# Patient Record
Sex: FEMALE | Race: WHITE | HISPANIC OR LATINO | Employment: UNEMPLOYED | URBAN - METROPOLITAN AREA
[De-identification: names, ages, dates, MRNs, and addresses within clinical notes are randomized per-mention and may not be internally consistent; named-entity substitution may affect disease eponyms.]

---

## 2017-03-31 ENCOUNTER — HOSPITAL ENCOUNTER (OUTPATIENT)
Dept: LAB | Facility: MEDICAL CENTER | Age: 37
End: 2017-03-31
Attending: PHYSICIAN ASSISTANT
Payer: COMMERCIAL

## 2017-03-31 LAB
ALBUMIN SERPL BCP-MCNC: 4.5 G/DL (ref 3.2–4.9)
ALBUMIN/GLOB SERPL: 1.3 G/DL
ALP SERPL-CCNC: 56 U/L (ref 30–99)
ALT SERPL-CCNC: 13 U/L (ref 2–50)
ANION GAP SERPL CALC-SCNC: 8 MMOL/L (ref 0–11.9)
AST SERPL-CCNC: 13 U/L (ref 12–45)
BASOPHILS # BLD AUTO: 0.03 K/UL (ref 0–0.12)
BASOPHILS NFR BLD AUTO: 0.3 % (ref 0–1.8)
BILIRUB SERPL-MCNC: 0.5 MG/DL (ref 0.1–1.5)
BUN SERPL-MCNC: 8 MG/DL (ref 8–22)
CALCIUM SERPL-MCNC: 9.4 MG/DL (ref 8.5–10.5)
CHLORIDE SERPL-SCNC: 105 MMOL/L (ref 96–112)
CO2 SERPL-SCNC: 24 MMOL/L (ref 20–33)
CREAT SERPL-MCNC: 0.53 MG/DL (ref 0.5–1.4)
EOSINOPHIL # BLD: 0.01 K/UL (ref 0–0.51)
EOSINOPHIL NFR BLD AUTO: 0.1 % (ref 0–6.9)
ERYTHROCYTE [DISTWIDTH] IN BLOOD BY AUTOMATED COUNT: 42.1 FL (ref 35.9–50)
GLOBULIN SER CALC-MCNC: 3.5 G/DL (ref 1.9–3.5)
GLUCOSE SERPL-MCNC: 106 MG/DL (ref 65–99)
HCT VFR BLD AUTO: 42.5 % (ref 37–47)
HGB BLD-MCNC: 13.7 G/DL (ref 12–16)
IMM GRANULOCYTES # BLD AUTO: 0.02 K/UL (ref 0–0.11)
IMM GRANULOCYTES NFR BLD AUTO: 0.2 % (ref 0–0.9)
LYMPHOCYTES # BLD: 1.14 K/UL (ref 1–4.8)
LYMPHOCYTES NFR BLD AUTO: 13.3 % (ref 22–41)
MCH RBC QN AUTO: 28.8 PG (ref 27–33)
MCHC RBC AUTO-ENTMCNC: 32.2 G/DL (ref 33.6–35)
MCV RBC AUTO: 89.5 FL (ref 81.4–97.8)
MONOCYTES # BLD: 0.3 K/UL (ref 0–0.85)
MONOCYTES NFR BLD AUTO: 3.5 % (ref 0–13.4)
NEUTROPHILS # BLD: 7.08 K/UL (ref 2–7.15)
NEUTROPHILS NFR BLD AUTO: 82.6 % (ref 44–72)
NRBC # BLD AUTO: 0 K/UL
NRBC BLD-RTO: 0 /100 WBC
PLATELET # BLD AUTO: 278 K/UL (ref 164–446)
PMV BLD AUTO: 11.7 FL (ref 9–12.9)
POTASSIUM SERPL-SCNC: 4 MMOL/L (ref 3.6–5.5)
PROT SERPL-MCNC: 8 G/DL (ref 6–8.2)
RBC # BLD AUTO: 4.75 M/UL (ref 4.2–5.4)
SODIUM SERPL-SCNC: 137 MMOL/L (ref 135–145)
TSH SERPL DL<=0.005 MIU/L-ACNC: 1.23 UIU/ML (ref 0.3–3.7)
WBC # BLD AUTO: 8.6 K/UL (ref 4.8–10.8)

## 2017-03-31 PROCEDURE — 36415 COLL VENOUS BLD VENIPUNCTURE: CPT

## 2017-03-31 PROCEDURE — 85025 COMPLETE CBC W/AUTO DIFF WBC: CPT

## 2017-03-31 PROCEDURE — 84443 ASSAY THYROID STIM HORMONE: CPT

## 2017-03-31 PROCEDURE — 80053 COMPREHEN METABOLIC PANEL: CPT

## 2017-08-03 ENCOUNTER — HOSPITAL ENCOUNTER (OUTPATIENT)
Facility: MEDICAL CENTER | Age: 37
End: 2017-08-03
Attending: PHYSICIAN ASSISTANT
Payer: COMMERCIAL

## 2017-08-03 PROCEDURE — 88175 CYTOPATH C/V AUTO FLUID REDO: CPT

## 2017-08-03 PROCEDURE — 87624 HPV HI-RISK TYP POOLED RSLT: CPT

## 2017-08-04 ENCOUNTER — HOSPITAL ENCOUNTER (OUTPATIENT)
Facility: MEDICAL CENTER | Age: 37
End: 2017-08-04
Attending: PHYSICIAN ASSISTANT
Payer: COMMERCIAL

## 2017-08-04 ENCOUNTER — HOSPITAL ENCOUNTER (OUTPATIENT)
Dept: LAB | Facility: MEDICAL CENTER | Age: 37
End: 2017-08-04
Attending: PHYSICIAN ASSISTANT
Payer: COMMERCIAL

## 2017-08-04 ENCOUNTER — HOSPITAL ENCOUNTER (OUTPATIENT)
Dept: RADIOLOGY | Facility: MEDICAL CENTER | Age: 37
End: 2017-08-04
Attending: PHYSICIAN ASSISTANT
Payer: COMMERCIAL

## 2017-08-04 DIAGNOSIS — R63.4 LOSS OF WEIGHT: ICD-10-CM

## 2017-08-04 LAB — GFR SERPL CREATININE-BSD FRML MDRD: >60 ML/MIN/1.73 M 2

## 2017-08-04 PROCEDURE — 87389 HIV-1 AG W/HIV-1&-2 AB AG IA: CPT

## 2017-08-04 PROCEDURE — 86141 C-REACTIVE PROTEIN HS: CPT

## 2017-08-04 PROCEDURE — 81001 URINALYSIS AUTO W/SCOPE: CPT

## 2017-08-04 PROCEDURE — 83036 HEMOGLOBIN GLYCOSYLATED A1C: CPT

## 2017-08-04 PROCEDURE — 80053 COMPREHEN METABOLIC PANEL: CPT

## 2017-08-04 PROCEDURE — 84436 ASSAY OF TOTAL THYROXINE: CPT

## 2017-08-04 PROCEDURE — 84480 ASSAY TRIIODOTHYRONINE (T3): CPT

## 2017-08-04 PROCEDURE — 85652 RBC SED RATE AUTOMATED: CPT

## 2017-08-04 PROCEDURE — 86803 HEPATITIS C AB TEST: CPT

## 2017-08-04 PROCEDURE — 85025 COMPLETE CBC W/AUTO DIFF WBC: CPT

## 2017-08-04 PROCEDURE — 84479 ASSAY OF THYROID (T3 OR T4): CPT

## 2017-08-04 PROCEDURE — 84443 ASSAY THYROID STIM HORMONE: CPT

## 2017-08-04 PROCEDURE — 71020 DX-CHEST-2 VIEWS: CPT

## 2017-08-04 PROCEDURE — 36415 COLL VENOUS BLD VENIPUNCTURE: CPT

## 2017-08-05 ENCOUNTER — HOSPITAL ENCOUNTER (OUTPATIENT)
Facility: MEDICAL CENTER | Age: 37
End: 2017-08-05
Attending: PHYSICIAN ASSISTANT
Payer: COMMERCIAL

## 2017-08-05 LAB
ALBUMIN SERPL BCP-MCNC: 4.6 G/DL (ref 3.2–4.9)
ALBUMIN/GLOB SERPL: 1.5 G/DL
ALP SERPL-CCNC: 49 U/L (ref 30–99)
ALT SERPL-CCNC: 11 U/L (ref 2–50)
ANION GAP SERPL CALC-SCNC: 7 MMOL/L (ref 0–11.9)
APPEARANCE UR: CLEAR
AST SERPL-CCNC: 14 U/L (ref 12–45)
BACTERIA #/AREA URNS HPF: ABNORMAL /HPF
BASOPHILS # BLD AUTO: 0.4 % (ref 0–1.8)
BASOPHILS # BLD: 0.02 K/UL (ref 0–0.12)
BILIRUB SERPL-MCNC: 0.7 MG/DL (ref 0.1–1.5)
BILIRUB UR QL STRIP.AUTO: NEGATIVE
BUN SERPL-MCNC: 8 MG/DL (ref 8–22)
CALCIUM SERPL-MCNC: 9.8 MG/DL (ref 8.5–10.5)
CHLORIDE SERPL-SCNC: 107 MMOL/L (ref 96–112)
CO2 SERPL-SCNC: 24 MMOL/L (ref 20–33)
COLOR UR: ABNORMAL
CREAT SERPL-MCNC: 0.57 MG/DL (ref 0.5–1.4)
CRP SERPL HS-MCNC: 1.4 MG/L (ref 0–7.5)
CYTOLOGY REG CYTOL: NORMAL
EOSINOPHIL # BLD AUTO: 0.06 K/UL (ref 0–0.51)
EOSINOPHIL NFR BLD: 1.2 % (ref 0–6.9)
EPI CELLS #/AREA URNS HPF: ABNORMAL /HPF
ERYTHROCYTE [DISTWIDTH] IN BLOOD BY AUTOMATED COUNT: 41.8 FL (ref 35.9–50)
ERYTHROCYTE [SEDIMENTATION RATE] IN BLOOD BY WESTERGREN METHOD: 11 MM/HOUR (ref 0–20)
EST. AVERAGE GLUCOSE BLD GHB EST-MCNC: 103 MG/DL
GLOBULIN SER CALC-MCNC: 3.1 G/DL (ref 1.9–3.5)
GLUCOSE SERPL-MCNC: 83 MG/DL (ref 65–99)
GLUCOSE UR STRIP.AUTO-MCNC: NEGATIVE MG/DL
HBA1C MFR BLD: 5.2 % (ref 0–5.6)
HCT VFR BLD AUTO: 41.4 % (ref 37–47)
HCV AB SER QL: NEGATIVE
HGB BLD-MCNC: 13.6 G/DL (ref 12–16)
HIV 1+2 AB+HIV1 P24 AG SERPL QL IA: NON REACTIVE
HPV HR 12 DNA CVX QL NAA+PROBE: NEGATIVE
HPV16 DNA SPEC QL NAA+PROBE: NEGATIVE
HPV18 DNA SPEC QL NAA+PROBE: NEGATIVE
IMM GRANULOCYTES # BLD AUTO: 0.01 K/UL (ref 0–0.11)
IMM GRANULOCYTES NFR BLD AUTO: 0.2 % (ref 0–0.9)
KETONES UR STRIP.AUTO-MCNC: NEGATIVE MG/DL
LEUKOCYTE ESTERASE UR QL STRIP.AUTO: NEGATIVE
LYMPHOCYTES # BLD AUTO: 1.14 K/UL (ref 1–4.8)
LYMPHOCYTES NFR BLD: 22.1 % (ref 22–41)
MCH RBC QN AUTO: 29.4 PG (ref 27–33)
MCHC RBC AUTO-ENTMCNC: 32.9 G/DL (ref 33.6–35)
MCV RBC AUTO: 89.4 FL (ref 81.4–97.8)
MICRO URNS: ABNORMAL
MONOCYTES # BLD AUTO: 0.36 K/UL (ref 0–0.85)
MONOCYTES NFR BLD AUTO: 7 % (ref 0–13.4)
MUCOUS THREADS #/AREA URNS HPF: ABNORMAL /HPF
NEUTROPHILS # BLD AUTO: 3.56 K/UL (ref 2–7.15)
NEUTROPHILS NFR BLD: 69.1 % (ref 44–72)
NITRITE UR QL STRIP.AUTO: NEGATIVE
NRBC # BLD AUTO: 0 K/UL
NRBC BLD AUTO-RTO: 0 /100 WBC
PH UR STRIP.AUTO: 8 [PH]
PLATELET # BLD AUTO: 213 K/UL (ref 164–446)
PMV BLD AUTO: 11.8 FL (ref 9–12.9)
POTASSIUM SERPL-SCNC: 3.9 MMOL/L (ref 3.6–5.5)
PROT SERPL-MCNC: 7.7 G/DL (ref 6–8.2)
PROT UR QL STRIP: NEGATIVE MG/DL
RBC # BLD AUTO: 4.63 M/UL (ref 4.2–5.4)
RBC # URNS HPF: ABNORMAL /HPF
RBC UR QL AUTO: ABNORMAL
SODIUM SERPL-SCNC: 138 MMOL/L (ref 135–145)
SP GR UR STRIP.AUTO: 1
SPECIMEN SOURCE: NORMAL
T3 SERPL-MCNC: 91.2 NG/DL (ref 60–181)
T4 SERPL-MCNC: 7.1 UG/DL (ref 4–12)
TSH SERPL DL<=0.005 MIU/L-ACNC: 1.89 UIU/ML (ref 0.3–3.7)
UROBILINOGEN UR STRIP.AUTO-MCNC: 0.2 MG/DL
WBC # BLD AUTO: 5.2 K/UL (ref 4.8–10.8)
WBC #/AREA URNS HPF: ABNORMAL /HPF

## 2017-08-05 PROCEDURE — 82274 ASSAY TEST FOR BLOOD FECAL: CPT

## 2017-08-06 LAB — T3RU NFR SERPL: 36 % (ref 28–41)

## 2017-08-09 LAB — HEMOCCULT STL QL IA: NEGATIVE

## 2017-08-12 ENCOUNTER — APPOINTMENT (OUTPATIENT)
Dept: RADIOLOGY | Facility: MEDICAL CENTER | Age: 37
End: 2017-08-12
Attending: PHYSICIAN ASSISTANT
Payer: COMMERCIAL

## 2018-01-02 ENCOUNTER — HOSPITAL ENCOUNTER (EMERGENCY)
Facility: MEDICAL CENTER | Age: 38
End: 2018-01-02
Attending: EMERGENCY MEDICINE
Payer: COMMERCIAL

## 2018-01-02 VITALS
TEMPERATURE: 98.3 F | RESPIRATION RATE: 17 BRPM | WEIGHT: 125.66 LBS | HEART RATE: 96 BPM | HEIGHT: 62 IN | OXYGEN SATURATION: 99 % | SYSTOLIC BLOOD PRESSURE: 118 MMHG | DIASTOLIC BLOOD PRESSURE: 82 MMHG | BODY MASS INDEX: 23.12 KG/M2

## 2018-01-02 DIAGNOSIS — J32.9 SINUSITIS, UNSPECIFIED CHRONICITY, UNSPECIFIED LOCATION: ICD-10-CM

## 2018-01-02 DIAGNOSIS — R10.9 ABDOMINAL PAIN, UNSPECIFIED ABDOMINAL LOCATION: ICD-10-CM

## 2018-01-02 DIAGNOSIS — J34.89 NASAL OBSTRUCTION: ICD-10-CM

## 2018-01-02 LAB
ALBUMIN SERPL BCP-MCNC: 4.3 G/DL (ref 3.2–4.9)
ALBUMIN/GLOB SERPL: 1.3 G/DL
ALP SERPL-CCNC: 60 U/L (ref 30–99)
ALT SERPL-CCNC: 10 U/L (ref 2–50)
ANION GAP SERPL CALC-SCNC: 3 MMOL/L (ref 0–11.9)
AST SERPL-CCNC: 13 U/L (ref 12–45)
BASOPHILS # BLD AUTO: 0.4 % (ref 0–1.8)
BASOPHILS # BLD: 0.03 K/UL (ref 0–0.12)
BILIRUB SERPL-MCNC: 0.2 MG/DL (ref 0.1–1.5)
BUN SERPL-MCNC: 8 MG/DL (ref 8–22)
CALCIUM SERPL-MCNC: 9.7 MG/DL (ref 8.5–10.5)
CHLORIDE SERPL-SCNC: 105 MMOL/L (ref 96–112)
CO2 SERPL-SCNC: 31 MMOL/L (ref 20–33)
CREAT SERPL-MCNC: 0.49 MG/DL (ref 0.5–1.4)
EOSINOPHIL # BLD AUTO: 0.05 K/UL (ref 0–0.51)
EOSINOPHIL NFR BLD: 0.7 % (ref 0–6.9)
ERYTHROCYTE [DISTWIDTH] IN BLOOD BY AUTOMATED COUNT: 38.9 FL (ref 35.9–50)
FLUAV RNA SPEC QL NAA+PROBE: NEGATIVE
FLUBV RNA SPEC QL NAA+PROBE: NEGATIVE
GFR SERPL CREATININE-BSD FRML MDRD: >60 ML/MIN/1.73 M 2
GLOBULIN SER CALC-MCNC: 3.2 G/DL (ref 1.9–3.5)
GLUCOSE SERPL-MCNC: 98 MG/DL (ref 65–99)
HCT VFR BLD AUTO: 36.8 % (ref 37–47)
HGB BLD-MCNC: 12.6 G/DL (ref 12–16)
IMM GRANULOCYTES # BLD AUTO: 0.03 K/UL (ref 0–0.11)
IMM GRANULOCYTES NFR BLD AUTO: 0.4 % (ref 0–0.9)
LYMPHOCYTES # BLD AUTO: 1.42 K/UL (ref 1–4.8)
LYMPHOCYTES NFR BLD: 20.8 % (ref 22–41)
MCH RBC QN AUTO: 29.6 PG (ref 27–33)
MCHC RBC AUTO-ENTMCNC: 34.2 G/DL (ref 33.6–35)
MCV RBC AUTO: 86.6 FL (ref 81.4–97.8)
MONOCYTES # BLD AUTO: 0.34 K/UL (ref 0–0.85)
MONOCYTES NFR BLD AUTO: 5 % (ref 0–13.4)
NEUTROPHILS # BLD AUTO: 4.96 K/UL (ref 2–7.15)
NEUTROPHILS NFR BLD: 72.7 % (ref 44–72)
NRBC # BLD AUTO: 0 K/UL
NRBC BLD-RTO: 0 /100 WBC
PLATELET # BLD AUTO: 272 K/UL (ref 164–446)
PMV BLD AUTO: 10.9 FL (ref 9–12.9)
POTASSIUM SERPL-SCNC: 3.5 MMOL/L (ref 3.6–5.5)
PROT SERPL-MCNC: 7.5 G/DL (ref 6–8.2)
RBC # BLD AUTO: 4.25 M/UL (ref 4.2–5.4)
SODIUM SERPL-SCNC: 139 MMOL/L (ref 135–145)
WBC # BLD AUTO: 6.8 K/UL (ref 4.8–10.8)

## 2018-01-02 PROCEDURE — 80053 COMPREHEN METABOLIC PANEL: CPT

## 2018-01-02 PROCEDURE — 85025 COMPLETE CBC W/AUTO DIFF WBC: CPT

## 2018-01-02 PROCEDURE — 99284 EMERGENCY DEPT VISIT MOD MDM: CPT

## 2018-01-02 PROCEDURE — 87502 INFLUENZA DNA AMP PROBE: CPT

## 2018-01-02 RX ORDER — AMOXICILLIN 875 MG/1
875 TABLET, COATED ORAL 2 TIMES DAILY
Qty: 14 TAB | Refills: 0 | Status: SHIPPED | OUTPATIENT
Start: 2018-01-02 | End: 2018-01-09

## 2018-01-02 ASSESSMENT — LIFESTYLE VARIABLES: DO YOU DRINK ALCOHOL: NO

## 2018-01-03 NOTE — ED NOTES
"Chief Complaint   Patient presents with   • Congestion     x 1 month   • Flu Like Symptoms      used - pt reports congestion x 1 month, flu like symptoms, recently dx with H pylori, c/o abdominal pain. Pt given mask to wear.     /82   Pulse 96   Temp 36.8 °C (98.3 °F) (Temporal)   Resp 17   Ht 1.575 m (5' 2\")   Wt 57 kg (125 lb 10.6 oz)   SpO2 99%   BMI 22.98 kg/m²     Pt Informed regarding triage process and verbalized understanding to inform triage tech or RN for any changes in condition.  Placed in lobby.    "

## 2018-01-03 NOTE — ED NOTES
Primary assessment complete. Chart up for review by erp. Patient complains of left sided weakness for 2 months. ERP informed

## 2018-01-03 NOTE — DISCHARGE INSTRUCTIONS
Sinusitis, Adult  Sinusitis is redness, soreness, and puffiness (inflammation) of the air pockets in the bones of your face (sinuses). The redness, soreness, and puffiness can cause air and mucus to get trapped in your sinuses. This can allow germs to grow and cause an infection.   HOME CARE   · Drink enough fluids to keep your pee (urine) clear or pale yellow.  · Use a humidifier in your home.  · Run a hot shower to create steam in the bathroom. Sit in the bathroom with the door closed. Breathe in the steam 3-4 times a day.  · Put a warm, moist washcloth on your face 3-4 times a day, or as told by your doctor.  · Use salt water sprays (saline sprays) to wet the thick fluid in your nose. This can help the sinuses drain.  · Only take medicine as told by your doctor.  GET HELP RIGHT AWAY IF:   · Your pain gets worse.  · You have very bad headaches.  · You are sick to your stomach (nauseous).  · You throw up (vomit).  · You are very sleepy (drowsy) all the time.  · Your face is puffy (swollen).  · Your vision changes.  · You have a stiff neck.  · You have trouble breathing.  MAKE SURE YOU:   · Understand these instructions.  · Will watch your condition.  · Will get help right away if you are not doing well or get worse.     This information is not intended to replace advice given to you by your health care provider. Make sure you discuss any questions you have with your health care provider.     Document Released: 06/05/2009 Document Revised: 01/08/2016 Document Reviewed: 07/23/2013  [employer] Interactive Patient Education ©2016 [employer]      Abdominal Pain, Adult  Many things can cause belly (abdominal) pain. Most times, the belly pain is not dangerous. Many cases of belly pain can be watched and treated at home.  HOME CARE   · Do not take medicines that help you go poop (laxatives) unless told to by your doctor.  · Only take medicine as told by your doctor.  · Eat or drink as told by your doctor. Your doctor will  tell you if you should be on a special diet.  GET HELP IF:  · You do not know what is causing your belly pain.  · You have belly pain while you are sick to your stomach (nauseous) or have runny poop (diarrhea).  · You have pain while you pee or poop.  · Your belly pain wakes you up at night.  · You have belly pain that gets worse or better when you eat.  · You have belly pain that gets worse when you eat fatty foods.  · You have a fever.  GET HELP RIGHT AWAY IF:   · The pain does not go away within 2 hours.  · You keep throwing up (vomiting).  · The pain changes and is only in the right or left part of the belly.  · You have bloody or tarry looking poop.  MAKE SURE YOU:   · Understand these instructions.  · Will watch your condition.  · Will get help right away if you are not doing well or get worse.     This information is not intended to replace advice given to you by your health care provider. Make sure you discuss any questions you have with your health care provider.     Document Released: 06/05/2009 Document Revised: 01/08/2016 Document Reviewed: 08/27/2014  [employer] Interactive Patient Education ©2016 [employer]

## 2018-01-03 NOTE — ED PROVIDER NOTES
"ED Provider Note    Scribed for Dr. Kilo Pichardo M.D. by Fawn Serrano. 1/2/2018  9:09 PM    Primary care provider: Pcp Pt States None  Means of arrival: Walk-in  History obtained from: Patient   History limited by: None    CHIEF COMPLAINT  Chief Complaint   Patient presents with   • Congestion     x 1 month   • Flu Like Symptoms       HPI  Sonja Bergeron is a 37 y.o. female who presents to the Emergency Department for a congestion and sinus pain associated that began one month ago and worsening today. Her sinus pain is exacerbated upon inspiration. She has used Anti-Histamine and Flonase with minimal relief. Patient came to ED tonight secondary to her congestion not improving. She also states that she was recently diagnosed with H-Pylori and anemia and has been taking her Prilosec and Vitamin B with minimal relief of her abdominal pain and weakness which she states is chronic. She describes her abdominal pain as \"intestinal cramps\" to her lower quadrant that is exacerbated with eating. [facility] for primary care.     : [id number]    REVIEW OF SYSTEMS  Pertinent positives include congestion, sinus pain. Pertinent negatives include no nausea, vomiting, fever . As above, all other systems are negative.  See HPI for further details.   C.    PAST MEDICAL HISTORY   H-Pylori, Anemia.     SURGICAL HISTORY   has a past surgical history that includes other neurological surg.    SOCIAL HISTORY  Social History   Substance Use Topics   • Smoking status: Never Smoker   • Alcohol use No      History   Drug Use No       FAMILY HISTORY  No family history noted    CURRENT MEDICATIONS  No current facility-administered medications on file prior to encounter.      Current Outpatient Prescriptions on File Prior to Encounter   Medication Sig Dispense Refill   • PREDNISONE Take  by mouth 2 Times a Day.       • ACYCLOVIR PO Take  by mouth every day.           ALLERGIES  No " "Known Allergies    PHYSICAL EXAM  VITAL SIGNS: /82   Pulse 96   Temp 36.8 °C (98.3 °F) (Temporal)   Resp 17   Ht 1.575 m (5' 2\")   Wt 57 kg (125 lb 10.6 oz)   SpO2 99%   BMI 22.98 kg/m²     Constitutional: Well developed, Well nourished, no distress, Non-toxic appearance.   HENT: Normocephalic, Atraumatic, Bilateral external ears normal, Oropharynx moist, No oral exudates. Right nostril appears to be occluded.   Eyes: PERRLA, EOMI, Conjunctiva normal, No discharge.   Neck: No tenderness, Supple, No stridor.   Lymphatic: No lymphadenopathy noted.   Cardiovascular: Normal heart rate, Normal rhythm.   Thorax & Lungs: Clear to auscultation bilaterally, No respiratory distress, No wheezing, No crackles.   Abdomen: Soft, epigastric tenderness, No masses, No pulsatile masses.   Skin: Warm, Dry, No erythema, No rash.   Extremities:, No edema No cyanosis.   Musculoskeletal: No tenderness to palpation or major deformities noted.  Intact distal pulses  Neurologic: Awake, alert. Moves all extremities spontaneously.  Psychiatric: Affect normal, Judgment normal, Mood normal.        LABS  Results for orders placed or performed during the hospital encounter of 01/02/18   INFLUENZA A/B BY PCR   Result Value Ref Range    Influenza virus A RNA Negative Negative    Influenza virus B, PCR Negative Negative   CBC WITH DIFFERENTIAL   Result Value Ref Range    WBC 6.8 4.8 - 10.8 K/uL    RBC 4.25 4.20 - 5.40 M/uL    Hemoglobin 12.6 12.0 - 16.0 g/dL    Hematocrit 36.8 (L) 37.0 - 47.0 %    MCV 86.6 81.4 - 97.8 fL    MCH 29.6 27.0 - 33.0 pg    MCHC 34.2 33.6 - 35.0 g/dL    RDW 38.9 35.9 - 50.0 fL    Platelet Count 272 164 - 446 K/uL    MPV 10.9 9.0 - 12.9 fL    Neutrophils-Polys 72.70 (H) 44.00 - 72.00 %    Lymphocytes 20.80 (L) 22.00 - 41.00 %    Monocytes 5.00 0.00 - 13.40 %    Eosinophils 0.70 0.00 - 6.90 %    Basophils 0.40 0.00 - 1.80 %    Immature Granulocytes 0.40 0.00 - 0.90 %    Nucleated RBC 0.00 /100 WBC    Neutrophils " (Absolute) 4.96 2.00 - 7.15 K/uL    Lymphs (Absolute) 1.42 1.00 - 4.80 K/uL    Monos (Absolute) 0.34 0.00 - 0.85 K/uL    Eos (Absolute) 0.05 0.00 - 0.51 K/uL    Baso (Absolute) 0.03 0.00 - 0.12 K/uL    Immature Granulocytes (abs) 0.03 0.00 - 0.11 K/uL    NRBC (Absolute) 0.00 K/uL   COMP METABOLIC PANEL   Result Value Ref Range    Sodium 139 135 - 145 mmol/L    Potassium 3.5 (L) 3.6 - 5.5 mmol/L    Chloride 105 96 - 112 mmol/L    Co2 31 20 - 33 mmol/L    Anion Gap 3.0 0.0 - 11.9    Glucose 98 65 - 99 mg/dL    Bun 8 8 - 22 mg/dL    Creatinine 0.49 (L) 0.50 - 1.40 mg/dL    Calcium 9.7 8.5 - 10.5 mg/dL    AST(SGOT) 13 12 - 45 U/L    ALT(SGPT) 10 2 - 50 U/L    Alkaline Phosphatase 60 30 - 99 U/L    Total Bilirubin 0.2 0.1 - 1.5 mg/dL    Albumin 4.3 3.2 - 4.9 g/dL    Total Protein 7.5 6.0 - 8.2 g/dL    Globulin 3.2 1.9 - 3.5 g/dL    A-G Ratio 1.3 g/dL   ESTIMATED GFR   Result Value Ref Range    GFR If African American >60 >60 mL/min/1.73 m 2    GFR If Non African American >60 >60 mL/min/1.73 m 2       COURSE & MEDICAL DECISION MAKING  Pertinent Labs & Imaging studies reviewed. (See chart for details)    9:09 PM - Patient seen and examined at bedside. Discussed that the patient has a nasal obstruction and advised her to follow-up with ENT. Discussed that there is little I can do in the ED for her congestion symptoms. For the patient's anemia, discussed I can check labs to evaluate. Ordered CBC, CMP, Influenza by PCR. Differential diagnosis included, but not limited to: nostril obstruction, sinusitis, peptic ulcer disease, pernicious anemia.     10:58 PM Recheck: Patient re-evaluated at beside. Discussed that patient's lab results do not indicate any abnormal findings. Discussed that I will treat patient with 875 Amoxicillin for sinus infection and advised patient to follow-up with Dr. Leonard (ENT). Patient was counseled to return to ED for any new or worsening symptoms. Patient understood and is in agreement for discharge.        Decision Making:  Patient is having several complaints which all seem to be long-standing duration with some obstruction of the right nostril possibly secondary sinusitis today, ongoing problems with abdominal pain previously diagnosis peptic ulcer disease and currently not being treated, long-standing left-sided weakness,. I think all these can be managed with outpatient follow-up. Will refer her to ENT for follow-up of his right nasal obstruction not clear if it's a polyp turbinate or other etiology may need further evaluation but I don't think he needs to be done on emergent basis since this is probably a year old. Did advise follow-up the heart clinic and advised use Prilosec for her abdominal pain  DISPOSITION:  Patient will be discharged home in stable condition.    FOLLOW UP:  Andra Carvajal M.D.  [address] [place] [id number]  [phone]            OUTPATIENT MEDICATIONS:  New Prescriptions    AMOXICILLIN (AMOXIL) 875 MG TABLET    Take 1 Tab by mouth 2 times a day for 7 days.         FINAL IMPRESSION  1. Nasal obstruction    2. Sinusitis, unspecified chronicity, unspecified location    3. Abdominal pain, unspecified abdominal location          Fawn GRAY (Silvia), am scribing for, and in the presence of, Kilo Pichardo M.D..    Electronically signed by: Fawn Serrano (Silvia), 1/2/2018    Kilo GRAY M.D. personally performed the services described in this documentation, as scribed by Fawn Serrano in my presence, and it is both accurate and complete.    The note accurately reflects work and decisions made by me.  Kilo Pichardo  1/2/2018  11:39 PM

## 2018-01-15 ENCOUNTER — HOSPITAL ENCOUNTER (OUTPATIENT)
Dept: LAB | Facility: MEDICAL CENTER | Age: 38
End: 2018-01-15
Attending: PHYSICIAN ASSISTANT
Payer: COMMERCIAL

## 2018-01-15 LAB
ERYTHROCYTE [DISTWIDTH] IN BLOOD BY AUTOMATED COUNT: 39.7 FL (ref 35.9–50)
FOLATE SERPL-MCNC: 20.4 NG/ML
HCT VFR BLD AUTO: 38.1 % (ref 37–47)
HGB BLD-MCNC: 12.6 G/DL (ref 12–16)
MCH RBC QN AUTO: 29 PG (ref 27–33)
MCHC RBC AUTO-ENTMCNC: 33.1 G/DL (ref 33.6–35)
MCV RBC AUTO: 87.8 FL (ref 81.4–97.8)
PLATELET # BLD AUTO: 234 K/UL (ref 164–446)
PMV BLD AUTO: 11.5 FL (ref 9–12.9)
RBC # BLD AUTO: 4.34 M/UL (ref 4.2–5.4)
VIT B12 SERPL-MCNC: 866 PG/ML (ref 211–911)
WBC # BLD AUTO: 6.1 K/UL (ref 4.8–10.8)

## 2018-01-15 PROCEDURE — 36415 COLL VENOUS BLD VENIPUNCTURE: CPT

## 2018-01-15 PROCEDURE — 82607 VITAMIN B-12: CPT

## 2018-01-15 PROCEDURE — 85027 COMPLETE CBC AUTOMATED: CPT

## 2018-01-15 PROCEDURE — 82746 ASSAY OF FOLIC ACID SERUM: CPT

## 2018-01-24 ENCOUNTER — HOSPITAL ENCOUNTER (OUTPATIENT)
Dept: LAB | Facility: MEDICAL CENTER | Age: 38
End: 2018-01-24
Attending: PHYSICIAN ASSISTANT
Payer: COMMERCIAL

## 2018-01-24 PROCEDURE — 83013 H PYLORI (C-13) BREATH: CPT

## 2018-01-26 LAB — UREA BREATH TEST QL: NEGATIVE

## 2018-02-05 ENCOUNTER — HOSPITAL ENCOUNTER (OUTPATIENT)
Dept: RADIOLOGY | Facility: MEDICAL CENTER | Age: 38
End: 2018-02-05
Attending: OTOLARYNGOLOGY
Payer: COMMERCIAL

## 2018-02-05 DIAGNOSIS — D14.0 BENIGN NEOPLASM OF CARTILAGE OF NOSE: ICD-10-CM

## 2018-02-05 PROCEDURE — 70486 CT MAXILLOFACIAL W/O DYE: CPT

## 2018-02-26 ENCOUNTER — HOSPITAL ENCOUNTER (OUTPATIENT)
Dept: RADIOLOGY | Facility: MEDICAL CENTER | Age: 38
End: 2018-02-26
Attending: OTOLARYNGOLOGY
Payer: COMMERCIAL

## 2018-02-26 DIAGNOSIS — C44.311 BASAL CELL CARCINOMA OF NASOLABIAL GROOVE: ICD-10-CM

## 2018-02-26 DIAGNOSIS — C30.0 MALIGNANT NEOPLASM OF NASAL CAVITIES (HCC): ICD-10-CM

## 2018-02-26 PROCEDURE — 700117 HCHG RX CONTRAST REV CODE 255: Performed by: OTOLARYNGOLOGY

## 2018-02-26 PROCEDURE — 70543 MRI ORBT/FAC/NCK W/O &W/DYE: CPT

## 2018-02-26 PROCEDURE — A9579 GAD-BASE MR CONTRAST NOS,1ML: HCPCS | Performed by: OTOLARYNGOLOGY

## 2018-02-26 RX ADMIN — GADODIAMIDE 15 ML: 287 INJECTION INTRAVENOUS at 14:44

## 2018-02-26 NOTE — PROCEDURES
Language line  Giovanni [id number] used to explain MRI and obtain secondary safety screening. Pt verbalized understanding per .

## 2018-03-07 ENCOUNTER — APPOINTMENT (OUTPATIENT)
Dept: ADMISSIONS | Facility: MEDICAL CENTER | Age: 38
End: 2018-03-07
Attending: OTOLARYNGOLOGY
Payer: COMMERCIAL

## 2018-03-07 DIAGNOSIS — Z01.812 PRE-OPERATIVE LABORATORY EXAMINATION: ICD-10-CM

## 2018-03-07 LAB
ERYTHROCYTE [DISTWIDTH] IN BLOOD BY AUTOMATED COUNT: 40.3 FL (ref 35.9–50)
HCT VFR BLD AUTO: 39.7 % (ref 37–47)
HGB BLD-MCNC: 13.1 G/DL (ref 12–16)
MCH RBC QN AUTO: 29 PG (ref 27–33)
MCHC RBC AUTO-ENTMCNC: 33 G/DL (ref 33.6–35)
MCV RBC AUTO: 88 FL (ref 81.4–97.8)
PLATELET # BLD AUTO: 223 K/UL (ref 164–446)
PMV BLD AUTO: 11.5 FL (ref 9–12.9)
RBC # BLD AUTO: 4.51 M/UL (ref 4.2–5.4)
WBC # BLD AUTO: 4 K/UL (ref 4.8–10.8)

## 2018-03-07 PROCEDURE — 36415 COLL VENOUS BLD VENIPUNCTURE: CPT

## 2018-03-07 PROCEDURE — 85027 COMPLETE CBC AUTOMATED: CPT

## 2018-03-09 ENCOUNTER — HOSPITAL ENCOUNTER (OUTPATIENT)
Dept: RADIOLOGY | Facility: MEDICAL CENTER | Age: 38
End: 2018-03-09
Attending: OTOLARYNGOLOGY
Payer: COMMERCIAL

## 2018-03-09 DIAGNOSIS — C30.0 MALIGNANT NEOPLASM OF NASAL CAVITIES (HCC): ICD-10-CM

## 2018-03-09 PROCEDURE — A9552 F18 FDG: HCPCS

## 2018-03-12 ENCOUNTER — HOSPITAL ENCOUNTER (OUTPATIENT)
Facility: MEDICAL CENTER | Age: 38
End: 2018-03-13
Attending: OTOLARYNGOLOGY | Admitting: OTOLARYNGOLOGY
Payer: COMMERCIAL

## 2018-03-12 DIAGNOSIS — C30.0 SQUAMOUS CELL CARCINOMA OF NASAL CAVITY (HCC): ICD-10-CM

## 2018-03-12 LAB — HCG SERPL QL: NEGATIVE

## 2018-03-12 PROCEDURE — S1090 MOMETASONE SINUS IMPLANT: HCPCS | Performed by: OTOLARYNGOLOGY

## 2018-03-12 PROCEDURE — 88342 IMHCHEM/IMCYTCHM 1ST ANTB: CPT

## 2018-03-12 PROCEDURE — 160041 HCHG SURGERY MINUTES - EA ADDL 1 MIN LEVEL 4: Performed by: OTOLARYNGOLOGY

## 2018-03-12 PROCEDURE — A9270 NON-COVERED ITEM OR SERVICE: HCPCS | Performed by: OTOLARYNGOLOGY

## 2018-03-12 PROCEDURE — 88305 TISSUE EXAM BY PATHOLOGIST: CPT | Mod: 59

## 2018-03-12 PROCEDURE — 700102 HCHG RX REV CODE 250 W/ 637 OVERRIDE(OP)

## 2018-03-12 PROCEDURE — C1894 INTRO/SHEATH, NON-LASER: HCPCS | Performed by: OTOLARYNGOLOGY

## 2018-03-12 PROCEDURE — G0378 HOSPITAL OBSERVATION PER HR: HCPCS

## 2018-03-12 PROCEDURE — 160009 HCHG ANES TIME/MIN: Performed by: OTOLARYNGOLOGY

## 2018-03-12 PROCEDURE — 700105 HCHG RX REV CODE 258: Performed by: OTOLARYNGOLOGY

## 2018-03-12 PROCEDURE — 160048 HCHG OR STATISTICAL LEVEL 1-5: Performed by: OTOLARYNGOLOGY

## 2018-03-12 PROCEDURE — 160029 HCHG SURGERY MINUTES - 1ST 30 MINS LEVEL 4: Performed by: OTOLARYNGOLOGY

## 2018-03-12 PROCEDURE — 88341 IMHCHEM/IMCYTCHM EA ADD ANTB: CPT

## 2018-03-12 PROCEDURE — 700102 HCHG RX REV CODE 250 W/ 637 OVERRIDE(OP): Performed by: OTOLARYNGOLOGY

## 2018-03-12 PROCEDURE — 88360 TUMOR IMMUNOHISTOCHEM/MANUAL: CPT

## 2018-03-12 PROCEDURE — 500331 HCHG COTTONOID, SURG PATTIE: Performed by: OTOLARYNGOLOGY

## 2018-03-12 PROCEDURE — 500125 HCHG BOVIE, HANDLE: Performed by: OTOLARYNGOLOGY

## 2018-03-12 PROCEDURE — 88311 DECALCIFY TISSUE: CPT | Mod: 91

## 2018-03-12 PROCEDURE — 700111 HCHG RX REV CODE 636 W/ 250 OVERRIDE (IP): Performed by: OTOLARYNGOLOGY

## 2018-03-12 PROCEDURE — 700101 HCHG RX REV CODE 250: Mod: JW

## 2018-03-12 PROCEDURE — 96374 THER/PROPH/DIAG INJ IV PUSH: CPT

## 2018-03-12 PROCEDURE — 160035 HCHG PACU - 1ST 60 MINS PHASE I: Performed by: OTOLARYNGOLOGY

## 2018-03-12 PROCEDURE — 160002 HCHG RECOVERY MINUTES (STAT): Performed by: OTOLARYNGOLOGY

## 2018-03-12 PROCEDURE — 700111 HCHG RX REV CODE 636 W/ 250 OVERRIDE (IP)

## 2018-03-12 PROCEDURE — 501838 HCHG SUTURE GENERAL: Performed by: OTOLARYNGOLOGY

## 2018-03-12 PROCEDURE — 84703 CHORIONIC GONADOTROPIN ASSAY: CPT

## 2018-03-12 PROCEDURE — 502573 HCHG PACK, ENT: Performed by: OTOLARYNGOLOGY

## 2018-03-12 DEVICE — IMPLANT MOMETASONE FUROATE MINI: Type: IMPLANTABLE DEVICE | Status: FUNCTIONAL

## 2018-03-12 RX ORDER — LIDOCAINE HYDROCHLORIDE AND EPINEPHRINE 10; 10 MG/ML; UG/ML
INJECTION, SOLUTION INFILTRATION; PERINEURAL
Status: DISCONTINUED | OUTPATIENT
Start: 2018-03-12 | End: 2018-03-12 | Stop reason: HOSPADM

## 2018-03-12 RX ORDER — LIDOCAINE HYDROCHLORIDE 10 MG/ML
INJECTION, SOLUTION EPIDURAL; INFILTRATION; INTRACAUDAL; PERINEURAL
Status: COMPLETED
Start: 2018-03-12 | End: 2018-03-12

## 2018-03-12 RX ORDER — SODIUM CHLORIDE, SODIUM LACTATE, POTASSIUM CHLORIDE, CALCIUM CHLORIDE 600; 310; 30; 20 MG/100ML; MG/100ML; MG/100ML; MG/100ML
INJECTION, SOLUTION INTRAVENOUS CONTINUOUS
Status: DISCONTINUED | OUTPATIENT
Start: 2018-03-12 | End: 2018-03-13 | Stop reason: HOSPADM

## 2018-03-12 RX ORDER — ONDANSETRON 2 MG/ML
4 INJECTION INTRAMUSCULAR; INTRAVENOUS EVERY 6 HOURS PRN
Status: DISCONTINUED | OUTPATIENT
Start: 2018-03-12 | End: 2018-03-13 | Stop reason: HOSPADM

## 2018-03-12 RX ORDER — OXYMETAZOLINE HYDROCHLORIDE 0.05 G/100ML
2 SPRAY NASAL 2 TIMES DAILY PRN
Status: DISCONTINUED | OUTPATIENT
Start: 2018-03-12 | End: 2018-03-13 | Stop reason: HOSPADM

## 2018-03-12 RX ORDER — ECHINACEA PURPUREA EXTRACT 125 MG
2 TABLET ORAL 4 TIMES DAILY
Status: DISCONTINUED | OUTPATIENT
Start: 2018-03-12 | End: 2018-03-13 | Stop reason: HOSPADM

## 2018-03-12 RX ORDER — EPINEPHRINE 1 MG/ML
INJECTION INTRAMUSCULAR; INTRAVENOUS; SUBCUTANEOUS
Status: COMPLETED
Start: 2018-03-12 | End: 2018-03-12

## 2018-03-12 RX ORDER — MORPHINE SULFATE 4 MG/ML
2 INJECTION, SOLUTION INTRAMUSCULAR; INTRAVENOUS
Status: DISCONTINUED | OUTPATIENT
Start: 2018-03-12 | End: 2018-03-13 | Stop reason: HOSPADM

## 2018-03-12 RX ORDER — PREDNISONE 10 MG/1
30 TABLET ORAL DAILY
Status: DISCONTINUED | OUTPATIENT
Start: 2018-03-13 | End: 2018-03-13 | Stop reason: HOSPADM

## 2018-03-12 RX ORDER — DEXTROSE AND SODIUM CHLORIDE 5; .45 G/100ML; G/100ML
INJECTION, SOLUTION INTRAVENOUS CONTINUOUS
Status: DISCONTINUED | OUTPATIENT
Start: 2018-03-12 | End: 2018-03-13 | Stop reason: HOSPADM

## 2018-03-12 RX ORDER — OXYMETAZOLINE HYDROCHLORIDE 0.05 G/100ML
SPRAY NASAL
Status: COMPLETED
Start: 2018-03-12 | End: 2018-03-12

## 2018-03-12 RX ORDER — HYDROCODONE BITARTRATE AND ACETAMINOPHEN 5; 325 MG/1; MG/1
1-2 TABLET ORAL EVERY 4 HOURS PRN
Status: DISCONTINUED | OUTPATIENT
Start: 2018-03-12 | End: 2018-03-13 | Stop reason: HOSPADM

## 2018-03-12 RX ADMIN — SODIUM CHLORIDE, SODIUM LACTATE, POTASSIUM CHLORIDE, CALCIUM CHLORIDE: 600; 310; 30; 20 INJECTION, SOLUTION INTRAVENOUS at 09:00

## 2018-03-12 RX ADMIN — FENTANYL CITRATE 50 MCG: 50 INJECTION, SOLUTION INTRAMUSCULAR; INTRAVENOUS at 13:27

## 2018-03-12 RX ADMIN — DEXTROSE AND SODIUM CHLORIDE: 5; 450 INJECTION, SOLUTION INTRAVENOUS at 15:08

## 2018-03-12 RX ADMIN — FENTANYL CITRATE 50 MCG: 50 INJECTION, SOLUTION INTRAMUSCULAR; INTRAVENOUS at 12:28

## 2018-03-12 RX ADMIN — ONDANSETRON HYDROCHLORIDE 4 MG: 2 INJECTION, SOLUTION INTRAMUSCULAR; INTRAVENOUS at 14:44

## 2018-03-12 ASSESSMENT — LIFESTYLE VARIABLES
EVER_SMOKED: NEVER
ALCOHOL_USE: NO

## 2018-03-12 ASSESSMENT — PAIN SCALES - GENERAL
PAINLEVEL_OUTOF10: 0
PAINLEVEL_OUTOF10: 6
PAINLEVEL_OUTOF10: 6
PAINLEVEL_OUTOF10: 0
PAINLEVEL_OUTOF10: 7
PAINLEVEL_OUTOF10: 2
PAINLEVEL_OUTOF10: 6
PAINLEVEL_OUTOF10: 2
PAINLEVEL_OUTOF10: 2

## 2018-03-12 ASSESSMENT — PATIENT HEALTH QUESTIONNAIRE - PHQ9
SUM OF ALL RESPONSES TO PHQ QUESTIONS 1-9: 0
1. LITTLE INTEREST OR PLEASURE IN DOING THINGS: NOT AT ALL
SUM OF ALL RESPONSES TO PHQ9 QUESTIONS 1 AND 2: 0
2. FEELING DOWN, DEPRESSED, IRRITABLE, OR HOPELESS: NOT AT ALL

## 2018-03-12 NOTE — OR NURSING
1220 Pt arrived from OR, rec'd report from . VSS. No s/s of resp distress. Nasal sling and gauze applied. Ice pack to eyes placed for comfort. Pt sleepy, but arousable.   1245 Pt tolerating sips of water. Spouse to bedside. Updated on POC.   1330 Pt having 6/10 pain, see MAR.   1345 Pt reports good pain relief. Drinking water without difficulty.   1415 Pt ready for transfer. Report to [facility]. Pt taken via gurney by transport. Belongings on bed. Pt's spouse at bedside.

## 2018-03-12 NOTE — PROGRESS NOTES
Pt arrived to T408-1 via gurney, walked to bed with x2 assist, oriented x4, Pt rates pain 7 out of 10 in R nare, cold pack in use, declines further intervention at this time. Pt + N/V medicated per MAR. + BS, PTA 3/12/18 BM. Nasal sling in use, minimal sanguinous drainage at this time. Pt up with x1 assist. Labs noted, assessment complete. Pt updated on POC. Pt educated to use call light when in need of assisstance. Bed locked and in lowest position. All needs met at this time, call light within reach, will continue to monitor.

## 2018-03-12 NOTE — OR SURGEON
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    Anesthesiologist/Type of Anesthesia:  Anesthesiologist: Ed Bolivar M.D./General    Surgical Staff:  Circulator: Mary Melendez R.N.  Relief Circulator: Shobha Ibrahim R.N.  Relief Scrub: Celena Marrero  Scrub Person: Brie Pires    Specimens:  To pathology    Estimated Blood Loss: 100cc    Findings: see op note    Complications: none        3/12/2018 12:19 PM Andra Carvajal

## 2018-03-13 VITALS
DIASTOLIC BLOOD PRESSURE: 65 MMHG | SYSTOLIC BLOOD PRESSURE: 98 MMHG | TEMPERATURE: 97.7 F | RESPIRATION RATE: 18 BRPM | OXYGEN SATURATION: 97 % | BODY MASS INDEX: 22.77 KG/M2 | HEART RATE: 75 BPM | HEIGHT: 60 IN | WEIGHT: 115.96 LBS

## 2018-03-13 PROBLEM — C30.0 SQUAMOUS CELL CARCINOMA OF NASAL CAVITY (HCC): Status: ACTIVE | Noted: 2018-03-13

## 2018-03-13 PROCEDURE — 700105 HCHG RX REV CODE 258: Performed by: OTOLARYNGOLOGY

## 2018-03-13 PROCEDURE — 700102 HCHG RX REV CODE 250 W/ 637 OVERRIDE(OP): Performed by: OTOLARYNGOLOGY

## 2018-03-13 PROCEDURE — G0378 HOSPITAL OBSERVATION PER HR: HCPCS

## 2018-03-13 PROCEDURE — 700111 HCHG RX REV CODE 636 W/ 250 OVERRIDE (IP): Performed by: OTOLARYNGOLOGY

## 2018-03-13 PROCEDURE — 96361 HYDRATE IV INFUSION ADD-ON: CPT

## 2018-03-13 PROCEDURE — A9270 NON-COVERED ITEM OR SERVICE: HCPCS | Performed by: OTOLARYNGOLOGY

## 2018-03-13 RX ORDER — PREDNISONE 10 MG/1
TABLET ORAL
Qty: 15 TAB | Refills: 0 | Status: SHIPPED | OUTPATIENT
Start: 2018-03-13

## 2018-03-13 RX ORDER — HYDROCODONE BITARTRATE AND ACETAMINOPHEN 5; 325 MG/1; MG/1
1 TABLET ORAL EVERY 4 HOURS PRN
Qty: 20 TAB | Refills: 0 | Status: SHIPPED | OUTPATIENT
Start: 2018-03-13 | End: 2018-03-20

## 2018-03-13 RX ADMIN — PREDNISONE 30 MG: 10 TABLET ORAL at 09:07

## 2018-03-13 RX ADMIN — Medication 2 SPRAY: at 13:32

## 2018-03-13 RX ADMIN — DEXTROSE AND SODIUM CHLORIDE: 5; 450 INJECTION, SOLUTION INTRAVENOUS at 04:05

## 2018-03-13 ASSESSMENT — PAIN SCALES - GENERAL
PAINLEVEL_OUTOF10: 5
PAINLEVEL_OUTOF10: 0
PAINLEVEL_OUTOF10: 5

## 2018-03-13 NOTE — PROGRESS NOTES
Bedside Report received   Assumed care of Ms Alice Khanna at 0700.    [place] nasal spray is not available pending a shipment today per pharmacy. Pt updated. Will continue to follow-up with pharmacy.   Pt is A&O x4.  Pain denied  Nausea denied   Tolerating regular diet  Sling dressing w/ drip pad to nose. Scant old drainage noted.   positive Urine output  Negative  BM   Positive Flatus  Up stby  SCD's refused  Bed in lowest position and locked.  Bed alarm not applicable per sapna cui   Pt resting comfortably now.  Review plan of care with patient  Call light within reach  Hourly rounds in place  All needs met at this time

## 2018-03-13 NOTE — PROGRESS NOTES
A/Ox 4, VSS.  Reports minimal R nasal, maxillary and facial pain 5 /10, medicated per MAR, ice applied.  Nasal sling in place with drip pad, minimal serosanguinous output.  + WATKINS 5/5, denies numbness and tingling.  RA, satting >90%, denies SOB or difficulty breathing.  + normo active BSx4, + flatus, no BM this shift, LBM pta, denies n/v.  Tolerating regular diet  + void, QS.  Up with SBA, tolerates activity well.  Up to BR multiple times this shift.  IVF to PIV, PO intake encouraged.  Call light within reach, calls appropriately.  Bed in lowest locked position.

## 2018-03-13 NOTE — DISCHARGE SUMMARY
CHIEF COMPLAINT ON ADMISSION  No chief complaint on file.      CODE STATUS  Full Code    HPI & HOSPITAL COURSE  This is a 38 y.o. 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 She underwent resection, septoplasty, right sinus surgery on 3/12/18 and was admitted to the floor postoperatively for observation. She had no apparent complications. She was tolerating a regular diet, had minimal pain or nasal drainage, and was ambulating independently.       Therefore, she is discharged in good and stable condition with close outpatient follow-up.    SPECIFIC OUTPATIENT FOLLOW-UP  Dr. 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 *      FOLLOW UP  No future appointments.  No follow-up provider specified.    MEDICATIONS ON DISCHARGE   Sonja Alcantara   Home Medication Instructions PRIETO:[id number]    Printed on:03/13/18 0810   Medication Information                      HYDROcodone-acetaminophen (NORCO) 5-325 MG Tab per tablet  Take 1 Tab by mouth every four hours as needed for up to 7 days.             Omega 3-6-9 Fatty Acids (OMEGA-3-6-9 PO)  Take  by mouth.             predniSONE (DELTASONE) 10 MG Tab  30 mg daily for 2 days, 20 mg daily for 3 days, 10 mg daily for 3 days             Probiotic Product (PROBIOTIC DAILY PO)  Take  by mouth every day.             SPIRULINA PO  Take  by mouth every day.                 DIET  Orders Placed This Encounter   Procedures   • DIET ORDER     Standing Status:   Standing     Number of Occurrences:   1     Order Specific Question:   Diet:     Answer:   Regular [1]       ACTIVITY  As tolerated.  Weight bearing as tolerated      CONSULTATIONS  none    PROCEDURES  As above    LABORATORY  Lab Results   Component Value Date/Time    SODIUM 139 01/02/2018 10:02 PM    POTASSIUM 3.5 (L) 01/02/2018 10:02 PM    CHLORIDE  105 01/02/2018 10:02 PM    CO2 31 01/02/2018 10:02 PM    GLUCOSE 98 01/02/2018 10:02 PM    BUN 8 01/02/2018 10:02 PM    CREATININE 0.49 (L) 01/02/2018 10:02 PM        Lab Results   Component Value Date/Time    WBC 4.0 (L) 03/07/2018 09:44 AM    HEMOGLOBIN 13.1 03/07/2018 09:44 AM    HEMATOCRIT 39.7 03/07/2018 09:44 AM    PLATELETCT 223 03/07/2018 09:44 AM        Total time of the discharge process exceeds 31 minutes

## 2018-03-13 NOTE — PROGRESS NOTES
Pt states only available transportation is  who will get off work at 5pm in [place] and come pick her up after.  Charge RN notified.

## 2018-03-14 NOTE — OP REPORT
DATE OF SERVICE:  03/12/2018    PREOPERATIVE DIAGNOSES:  1.  Right nasal cavity squamous cell carcinoma.  2.  Chronic maxillary sinusitis.  3.  Chronic ethmoid sinusitis.  4.  Chronic sphenoid sinusitis.  5.  Chronic frontal sinusitis.  6.  Nasal septal deviation.    POSTOPERATIVE DIAGNOSES:  1.  Right nasal cavity squamous cell carcinoma.  2.  Chronic maxillary sinusitis.  3.  Chronic ethmoid sinusitis.  4.  Chronic sphenoid sinusitis.  5.  Chronic frontal sinusitis.  6.  Nasal septal deviation.    OPERATION PERFORMED:  1.  Resection of right nasal cavity squamous cell carcinoma.  2.  Right inferior turbinectomy to facilitate kerline maxillary antrostomy.  3.  Right maxillary antrostomy with tissue removal.  4.  Right sphenoethmoidectomy.  5.  Right frontal sinusotomy.  6.  Septoplasty.  7.  Utilization of image guidance, extradural.    SURGEON:  Andra Carvajal MD    ANESTHESIOLOGIST:  Ed Bolivar MD    ANESTHESIA:  General.    ESTIMATED BLOOD LOSS:  100 mL.    COMPLICATIONS:  None.    FINDINGS:  1.  Large right nasal cavity squamous cell carcinoma filling the entirety of   the nasal cavity.  2.  Apparent attachment either a right inferior turbinate or along the medial   wall of the right maxillary sinus.  3.  No extension into paranasal sinuses.    INDICATIONS FOR PROCEDURE:  The patient is a 38-year-old female with a 4-6   month history of worsening right nasal obstruction and sinusitis symptoms.    She was found to have a right nasal mass and biopsy confirmed squamous cell   carcinoma.  Imaging revealed that the tumor was confined to her right nasal   cavity without any other extension or metastases.  As such, it was recommended   she undergo the above-stated procedures.  These were explained in detail.    Risks were discussed including, but not limited to pain, bleeding, infection,   scar formation, recurrence of disease requiring further treatments, change in   vision, damage to orbit or skull base, change  in sense of smell, continued   nasal obstruction and need for further surgeries.  Informed surgical consent   was obtained.    DESCRIPTION OF OPERATION:  Patient was met in the preoperative holding area   and again the consent and history were reviewed.  She was brought out to the   operating room in good condition.  After appropriate anesthetic markers were   placed, a surgical time-out was taken and general endotracheal anesthesia was   induced.  The bed was then turned to 180 degrees.  The navigation system was   calibrated and noted to be functioning properly.  The nose was prepped with   epinephrine-soaked cottonoids for decongestion.  The patient was then prepped   and draped in the usual sterile fashion.  I first began with bilateral nasal   endoscopy.  This revealed a severe left nasal septal deviation, nearly   completely obstructing the left nares.  The right nasal cavity mass was then   partially debulked using initially a Blakesley forceps and then a   microdebrider.  This was tracked all the way back until the attachment point   could be identified.  It did seem to be adherent along the right inferior   turbinate and possibly along the medial wall of the maxillary sinus on the   right hand side.  There do not seem to be a strong attachment to the septum,   nasal floor and no extension into the paranasal sinuses or into the maxillary   sinus itself, though it did compress the medial wall of the maxillary sinus   laterally.  The right inferior turbinate was then resected and passed off as   specimen.  I then proceeded with a total uncinectomy.  A 30-degree endoscope   was utilized to view more laterally and the uncinate was completely removed.    The natural ostium of the maxillary sinus was then incorporated into a large   antrostomy.  This uncinate was then passed off as specimen.  There was a   significant amount of polypoid tissue within the maxillary sinus along with   purulence that was debrided and  the polyps were removed.  Again, there was no   apparent extension of the tumor into the sinus itself.  After the inferior   turbinate had been removed, the inferior portion of the medial wall of the   maxillary sinus was removed down to the nasal floor.  This created a kerline   maxillary antrostomy and facilitated both with removal of the polypoid tissue   and ensured complete resection in the area where the tumor had been inherent.    The middle turbinate was noted to be quite atrophic.  A curette was then used   to enter into the ethmoid sinuses and then the bulla and subsequently the   posterior lamella were resected.  All ethmoid partitions were opened widely.    The superior turbinate was identified and the inferior portion of the superior   turbinate was resected.  The natural sphenoid ostium was identified and   enlarged.  There was noted to be thick purulence and edema throughout the   entirety of the ethmoid cavity and sphenoid sinus.  The orbit and skull base   were identified posteriorly in the sphenoid and posterior ethmoid cells and   dissection was then continued from posterior to anterior along the ethmoid   skull base and orbit to ensure all ethmoid partitions were removed.  A   30-degree endoscope was utilized to visualize superiorly and ensure complete   resection had been performed.  The anterior ethmoid artery was identified and   preserved.  I then turned my attention towards dissection of the frontal   recess.  Again, there was noted to be significant edema and chronic   inflammation in this area.  The image guidance navigation system was utilized   in this area to ensure adequate dissection and wide openings were able to be   achieved.  Given the significant polypoid tissue within the frontal sinus   itself and the frontal sinus opening, decision was made to place a Propel   frontal sinus stent.  One Propel mini stent was placed into the frontal recess   to control polypoid tissue and stent  open the sinus.  All bleeding was   controlled using epinephrine-soaked cottonoids and noted to be adequate.  The   middle turbinate was partially resected back to its lamella and a suction   Bovie electrocautery was used to cauterize the arterial blood supply at   lamella of the middle and inferior turbinates.  I then proceeded with a   septoplasty and partial septectomy.  The mucosa along the right septum was   completely elevated and resected and sent off as a separate specimen to   evaluate for margins.  The deviated portions of the cartilage and bone were   similarly completely resected leaving the mucosal flap on the left hand side   intact.  Again, there did not appear to be any apparent extension through the   septum itself.  The mucosa of the nasal floor was then elevated and similarly   resected.  The nose was then copiously irrigated using normal saline and again   hemostasis was ensured using epinephrine-soaked cottonoids.  The left nasal   cavity was examined and noted to be patent and the middle meatus was clear   without any purulence or polyps.  The procedure was then terminated.  Care of   the patient was turned back over to anesthesia for emergence and extubation.    She was taken to the PACU in stable condition.  All instrument counts were   correct x2 at the completion of the case, there were no apparent   complications.       ____________________________________     MD KAITLYNN Roberson / TOM    DD:  03/14/2018 08:23:46  DT:  03/14/2018 10:19:05    D#:  [id number]  Job#:  [id number]

## 2018-03-14 NOTE — PROGRESS NOTES
Discharging Patient home per physician order.  Discharged home with .  Demonstrated understanding of discharge instructions, follow up appointments, home medications, prescriptions, home care for surgical wound, and nursing care instructions.  Ambulating with no assistance, voiding without difficulty, pain well controlled, tolerating oral medications, oxygen saturation greater than 90% , tolerating reg diet.   Educational handouts on septoplasty and medications given and discussed.  Verbalized understanding of discharge instructions and educational handouts.  All questions answered.  Belongings with patient at time of discharge.

## 2018-03-14 NOTE — DISCHARGE INSTRUCTIONS
Discharge Instructions    Discharged to home by car with relative. Discharged via wheelchair, hospital escort: Yes.  Special equipment needed: Not Applicable    Be sure to schedule a follow-up appointment with your primary care doctor or any specialists as instructed.     Discharge Plan:   Diet Plan: Discussed  Activity Level: Discussed  Confirmed Follow up Appointment: Patient to Call and Schedule Appointment  Confirmed Symptoms Management: Discussed  Medication Reconciliation Updated: Yes  Influenza Vaccine Indication: Patient Refuses    I understand that a diet low in cholesterol, fat, and sodium is recommended for good health. Unless I have been given specific instructions below for another diet, I accept this instruction as my diet prescription.   Other diet: Regular diet as tolerated    Special Instructions:     ACTIVITY: Rest and take it easy for the first 24 hours.  A responsible adult is recommended to remain with you during that time.  It is normal to feel sleepy.  We encourage you to not do anything that requires balance, judgment or coordination.    MILD FLU-LIKE SYMPTOMS ARE NORMAL. YOU MAY EXPERIENCE GENERALIZED MUSCLE ACHES, THROAT IRRITATION, HEADACHE AND/OR SOME NAUSEA.    FOR 24 HOURS DO NOT:  Drive, operate machinery or run household appliances.  Drink beer or alcoholic beverages.   Make important decisions or sign legal documents.    SPECIAL INSTRUCTIONS: Call MD for any swelling of eye, decreased vision, double vision, and heavy bleeding. Do not lie flat to sleep. Do not blow nose for at least a week.     DIET: To avoid nausea, slowly advance diet as tolerated, avoiding spicy or greasy foods for the first day.  Add more substantial food to your diet according to your physician's instructions.  Babies can be fed formula or breast milk as soon as they are hungry.  INCREASE FLUIDS AND FIBER TO AVOID CONSTIPATION.    SURGICAL DRESSING/BATHING: no soaking    FOLLOW-UP APPOINTMENT:  A follow-up  appointment should be arranged with your doctor in one week; call to schedule.    You should CALL YOUR PHYSICIAN if you develop:  Fever greater than 101 degrees F.  Pain not relieved by medication, or persistent nausea or vomiting.  Excessive bleeding (blood soaking through dressing) or unexpected drainage from the wound.  Extreme redness or swelling around the incision site, drainage of pus or foul smelling drainage.  Inability to urinate or empty your bladder within 8 hours.  Problems with breathing or chest pain.    You should call 911 if you develop problems with breathing or chest pain.  If you are unable to contact your doctor or surgical center, you should go to the nearest emergency room or urgent care center.      If any questions arise, call your doctor.  If your doctor is not available, please feel free to call the Surgical Center at [phone].  The Center is open Monday through Friday from 7AM to 7PM.  You can also call the [facility] open 24 hours/day, 7 days/week and speak to a nurse at [phone], or toll free at [phone].    A registered nurse may call you a few days after your surgery to see how you are doing after your procedure.    MEDICATIONS: Resume taking daily medication.  Take prescribed pain medication with food.  If no medication is prescribed, you may take non-aspirin pain medication if needed.  PAIN MEDICATION CAN BE VERY CONSTIPATING.  Take a stool softener or laxative such as senokot, pericolace, or milk of magnesia if needed.    If your physician has prescribed pain medication that includes Acetaminophen (Tylenol), do not take additional Acetaminophen (Tylenol) while taking the prescribed medication.      ACTIVIDAD: Descanse y tómalo con calma madi las primeras 24 horas. Se recomienda que un adulto responsable se quede con usted madi delmis tiempo. Es normal sentirse somnoliento. Le recomendamos que no kusum nada que requiera equilibrio, juicio o  coordinación.    LOS SÍNTOMAS DE GRIPE LEVE SON NORMALES. PUEDE EXPERIMENTAR ACCIDENTES MUSCULARES GENERALIZADAS, IRRITACIÓN DE LA GARGANTA, DOLOR DE KELLY Y / O ALGUNA NAUSEA.    POR 24 HORAS NO HACER:  Conduzca, opere maquinaria o use electrodomésticos.  Anne cerveza o bebidas alcohólicas.  [place] decisiones importantes o firme documentos legales.    INSTRUCCIONES ESPECIALES: Llame a MD para cualquier hinchazón ocular, disminución de la visión, visión doble y sangrado abundante. No te acuestes para dormir. No se sonría madi al menos jenise semana.    DIETA: Para evitar las náuseas, avance lentamente la dieta según lo tolera, evitando las comidas picantes o grasosas madi el primer día. Agregue alimentos más sustanciales a wilson dieta de acuerdo con las instrucciones de wilson médico. Los bebés pueden ser alimentados con fórmula o leche materna tan pronto simon tengan hambre. AUMENTAR LOS FLUIDOS Y LA FIBRA PARA EVITAR EL ESTREÑIMIENTO.    VESTIDO / BAÑO QUIRÚRGICO: sin remojo    MITRA DE SEGUIMIENTO:  Se debe programar jenise mitra de seguimiento con wilson médico en jenise semana; llamar para programar    Debe llamar a wilson médico si desarrolla:  Fiebre mayor de 101 grados F.  Dolor que no se elisabet con medicamentos o náuseas o vómitos persistentes.  Sangrado excesivo (remojo de mihaela a través del vendaje) o drenaje inesperado de la herida.  Enrojecimiento o hinchazón extremo alrededor del sitio de la incisión, drenaje de pus o drenaje con mal olor.  Incapacidad para orinar o vaciar la vejiga dentro de las 8 horas.  Problemas con la respiración o dolor en el pecho.    Debe llamar al 911 si tiene problemas respiratorios o dolor en el pecho.  Si no puede comunicarse con wilson médico o centro quirúrgico, debe acudir a la julieta de emergencias o centro de atención de urgencias más cercano.    Si surge alguna pregunta, llame a wilson médico. Si wilson médico no está disponible, no dude en llamar al Cent[facility] Quir[facility] al [phone]. [place] está  abierto de lunes a viernes de 7 a. M. A 7 p. M. También puede llamar a la L[place]NEA DIRECTA DE PAUL[place] abierta las 24 horas del día, los 7 milagro de la semana y hablar con jenise enfermera al [phone], o al número gratuito al [phone].    Jenise enfermera registrada puede llamarlo unos días después de wilson cirugía para kristopher cómo le está yendo después del procedimiento.    MEDICAMENTOS: reanudar tomando medicación diaria. [place] medicamentos recetados para el dolor con comida. Si no se receta ningún medicamento, puede henry medicamentos para el dolor que no contengan aspirina si es necesario. EL MEDICAMENTO PARA EL DOLOR PUEDE SER MUY CONSTIPANTE. [place] un ablandador de heces o laxante simon senokot, pericolace o leche de magnesia si es necesario.    Si wilson médico le ha recetado medicamentos para el dolor que incluyen paracetamol (Tylenol), no tome acetaminofeno (Tylenol)               Septumplastia - Cuidados posteriores   (Septoplasty, Care After)  Siga estas instrucciones madi las próximas semanas. Estas indicaciones le proporcionan información general acerca de cómo deberá cuidarse después del procedimiento. El médico también podrá darle instrucciones más específicas. El tratamiento mcclain sido planificado según las prácticas médicas actuales, [place] en algunos casos pueden ocurrir problemas. Comuníquese con el médico si tiene algún problema o tiene preguntas después del procedimiento.   INSTRUCCIONES PARA EL CUIDADO EN EL HOGAR   · Si le lewis recetado antibióticos, tómelos según las indicaciones. Tómelos todos, aunque se sienta mejor.  · Solo tome medicamentos de venta micheal o recetados para el dolor, malestar o fiebre, según las indicaciones del médico.  · Puede limpiar erica fosas nasales con un aerosol nasal salino de venta micheal. [place] le ayudará a eliminar las costras y los coágulos de mihaela en la nariz. También puede hacer wilson propia solución salina mezclando lo siguiente:  · ½ cucharadita de sal.  · ½ cucharadita de  bicarbonato de sodio.  · 6 onzas (180 ml) de agua. Si la solución salina es muy irritante, puede agregarle más agua.  · No se suene la nariz madi 2 semanas después de la cirugía.  · Evite las actividades intensas simon correr o practicar deportes madi 2 semanas. [place] puede causar hemorragias nasales.  · No levante objetos que pesen más de 10 libras (4,5 kg) madi dos semanas, o lo que le indique el médico.  · Use un par de almohadas para elevar la jeremy cuando está acostado.  · Coma alimentos que contengan mucha fibra para mantener erica heces blandas, especialmente al henry medicamentos para el dolor. [place] sina el esforzarse para  el intestino, lo que puede causar jenise hemorragia nasal.  · Puede henry laxantes o ablandadores de heces según simon indique wilson médico.  · Cumpla con todas las visitas de control, según le indique wilson médico. Si tiene férulas nasales, estas se retirarán aproximadamente 1 semana después de la cirugía.  SOLICITE ATENCIÓN MÉDICA SI:   · Observa enrojecimiento, hinchazón o aumento del dolor en la nariz.  · Hay jenise secreción de color taylor amarillento (pus) en la nariz.  · Siente un dolor de jeremy intenso o rigidez en el fernando.  · Tiene diarrea o náuseas y vómitos graves.  · No puede respirar por la nariz.  · Tiene preguntas o preocupaciones.  SOLICITE ATENCIÓN MÉDICA DE INMEDIATO SI:   · Tiene jenise erupción o malestar estomacal.  · Tiene fiebre.  · Comienza a sentir falta de aire.  · Aparece alguna reacción o efecto secundario por los medicamentos.  · Se siente mareado o se desmaya.  · Tiene cambios en la visión o los ojos hinchados.  · Tiene jenise hemorragia por la nariz.  ASEGÚRESE DE QUE:   · Comprende estas instrucciones.  · Controlará wilson enfermedad.  · Solicitará ayuda de inmediato si no mejora o si empeora.  Document Released: 03/11/2013      Oxymetazoline nasal spray  ¿Qué es mya medicamento?  La OXIMETAZOLINA es un descongestionante nasal. Mya medicamento se utiliza para  aliviar la congestión nasal o nariz tapada. Mya medicamento no sirve para tratar jenise infección.  Mya medicamento puede ser utilizado para otros usos; si tiene alguna pregunta consulte con wilson proveedor de atención médica o con wilson farmacéutico.  MARCAS COMUNES: 12 Hour Nasal, Afrin, Afrin Extra Moisturizing, Afrin Nasal Sinus, Dristan, Duration, Genasal, Mucinex Full Force, Mucinex Moisture Smart, Mucinex Sinus-Max, Nasal Relief, Blake-Synephrine 12-Hour, Blake-Synephrine Severe Sinus Congestion, Nostrilla Fast Relief, Sinex 12-Hour, Sudafed OM Sinus Cold Moisturizing, Sudafed OM Sinus Congestion Moisturizing, Vicks Qlearquil Decongestant, Vicks Sinex, Vicks Sinex Severe, Vicks Sinus Daytime, Zicam Extreme Congestion Relief, Zicam Intense Sinus  ¿Qué le andrzej informar a mi profesional de la janet antes de henry mya medicamento?  Necesitan saber si usted presenta alguno de los siguientes problemas o situaciones:  diabetes glaucoma enfermedad cardiaca presión sanguínea berny o baja antecedentes de derrame cerebral Fenómeno de Raynaud esclerodermia síndrome de Sjogren tromboangitis obliterante enfermedad tiroidea problemas para orinar debido a un aumento del tamaño de la próstata jenise reacción alérgica o inusual a la oximetazolina, a otros medicamentos, alimentos, colorantes o conservantes si está embarazada o buscando quedar embarazada si está amamantando a un bebé  ¿Cómo andrzej utilizar mya medicamento?  Mya medicamento es para utilizarse en la nariz. No lo ingiera por vía oral. Siga las instrucciones de la etiqueta del envase. Agite mely antes de usar. Use wilson medicamento a intervalos regulares o según le indique wilson proveedor de atención médica. No lo use con jenise frecuencia mayor a la indicada. No lo use madi más de 3 días seguidos sin asesoramiento. Asegúrese de estar usando wilson aerosol nasal correctamente. Consulte a wilson médico o wilson profesional de la janet si tiene alguna pregunta.  Hable con wilson pediatra para informarse  acerca del uso de mya medicamento en niños. Aunque mya medicamento se puede recetar a niños tan pequeños simon de 6 años de edad en casos selectos, existen precauciones que deben cumplirse.  Sobredosis: Póngase en contacto inmediatamente con un centro toxicológico o jenise julieta de urgencia si usted bunny que haya tomado demasiado medicamento.  ATENCIÓN: Mya medicamento es solo para usted. No comparta mya medicamento con nadie.  ¿Qué sucede si me olvido de jenise dosis?  Si se olvida jenise dosis, úsela lo antes posible. Si es garcia la hora de la próxima dosis, use sólo nancy dosis. No use dosis adicionales o dobles.  ¿Qué puede interactuar con mya medicamento?  Mya medicamento podría interactuar con los siguientes fármacos:  IMAO, tales simon isocarboxazida, fenelzina, rasagilina, selegilina y tranilcipromina medicamentos para tratar la presión sanguínea y enfermedad cardiaca tales simon inhibidores de la ECA, betabloqueadores, bloqueadores de los blunt de calcio, digoxina y diuréticos medicamentos para tratar el agrandamiento de la próstata, tales simon alfuzosina, doxazosina, prazosina y terazosina nafarelina  Puede ser que esta lista no menciona todas las posibles interacciones. Informe a wilson profesional de la janet de todos los productos a base de hierbas, medicamentos de venta micheal o suplementos nutritivos que esté tomando. Si usted fuma, consume bebidas alcohólicas o si utiliza drogas ilegales, indíqueselo también a wilson profesional de la janet. Algunas sustancias pueden interactuar con wilson medicamento.  ¿A qué andrzej estar atento al usar mya medicamento?  Informe a wilson médico o a wilson profesional de la janet si erica síntomas no comienzan a mejorar o si empeoran.  Para evitar el contagio de la infección, no comparta la botella con ninguna otra persona.  ¿Qué efectos secundarios puedo tener al utilizar mya medicamento?  Efectos secundarios que debe informar a wilson médico o a wilson profesional de la janet tan pronto simon sea  posible:  reacciones alérgicas simon erupción cutánea, picazón o urticarias, hinchazón de la adrianna, labios o lengua  Efectos secundarios que, por lo general, no requieren atención médica (debe informarlos a wilson médico o a wilson profesional de la janet si persisten o si son molestos):  ardor, escozor o irritación dentro de la nariz después de usar aumento de secreción nasal estornudos  Puede ser que esta lista no menciona todos los posibles efectos secundarios. Comuníquese a wilson médico por asesoramiento médico sobre los efectos secundarios. Usted puede informar los efectos secundarios a la FDA por teléfono al 1-800-FDA-1088.  ¿Dónde andrzej guardar mi medicina?  [place]ala fuera del alcance de los niños.  Guárdela a temperatura ambiente, entre 20 y 25 grados C (68 y 77 grados F). Deseche todo el medicamento que no haya utilizado, después de la fecha de vencimiento.  ATENCIÓN: [place] folleto es un resumen. Puede ser que no cubra toda la posible información. Si usted tiene preguntas acerca de esta medicina, consulte con wilson médico, wilson farmacéutico o wilson profesional de la janet.  © 2018 Elsevier/Gold Standard (2017-04-03 00:00:00)    ExitCare® Patient Information ©2014 [employer].      Septoplasty, Care After  Refer to this sheet in the next few weeks. These instructions provide you with information about caring for yourself after your procedure. Your health care provider may also give you more specific instructions. Your treatment has been planned according to current medical practices, but problems sometimes occur. Call your health care provider if you have any problems or questions after your procedure.  WHAT TO EXPECT AFTER THE PROCEDURE  After your procedure, it is typical to have the following:  · Mild headache.  · Stuffy nose.  · Feeling of fullness in your ears.  · Bloody fluid coming from your nose.  HOME CARE INSTRUCTIONS  Medicines  · If you were prescribed an antibiotic medicine, finish it all even if you start to feel  better.  · Take medicines only as directed by your health care provider.  · You may be directed to clean your nostrils with a saline nasal spray. This will help to clear the crusts and blood clots in your nose. The solution can be found as an over-the-counter solution or made at home as directed by your health care provider.  · You may need to take laxatives or stool softeners as directed by your health care provider.  What to Avoid  · Do not blow your nose for 2 weeks after surgery or as directed by your health care provider.  · Do not lift anything heavier than 10 lb (4.5 kg) for 2 weeks or as directed by your health care provider.  · Avoid strenuous activities that can cause nosebleeds for 2 weeks. These include activities such as running or playing sports.  · Avoid very hot or steamy showers for several days after surgery or as directed by your health care provider.  Eating and Drinking  · Avoid eating hot and spicy foods for several days after surgery or as directed by your health care provider.  · Eat plenty of fiber to keep your stools soft, especially while you are taking pain medicine. This helps you to avoid straining, which can cause a nosebleed.  · Drink enough fluid to keep your urine clear or pale yellow.  General Instructions  · Raise (elevate) your head while you are lying down.  · Keep all follow-up visits as directed by your health care provider. This is important.  · If you have nasal splints, follow your health care provider's instructions about removal.  · If your nose was packed with gauze, follow your health care provider's instructions about removal.  SEEK MEDICAL CARE IF:  · You develop swelling or increased pain in your nose.  · You have yellowish-white fluid (pus) coming from your nose.  · You have severe diarrhea.  · You have ongoing (persistent) nausea.  · You cannot breathe through your nose.  · You have a fever.  SEEK IMMEDIATE MEDICAL CARE IF:  · You are short of breath.  · You feel  dizzy or you faint.  · You have vision changes.  · You are bleeding heavily from your nose.  · You are vomiting.  · You have a severe headache or a stiff neck.     This information is not intended to replace advice given to you by your health care provider. Make sure you discuss any questions you have with your health care provider.     Document Released: 12/18/2006 Document Revised: 01/08/2016 Document Reviewed: 07/29/2015  [employer] Patient Education ©2016 Elsevier Inc.    Oxymetazoline nasal spray  What is this medicine?  Oxymetazoline (OX ee me EL oh mirela) is a nasal decongestant. This medicine is used to treat nasal congestion or a stuffy nose. This medicine will not treat an infection.  This medicine may be used for other purposes; ask your health care provider or pharmacist if you have questions.  COMMON BRAND NAME(S): 12 Hour Nasal, Afrin, Afrin Extra Moisturizing, Afrin Nasal Sinus, Dristan, Duration, Genasal, Mucinex Full Force, Mucinex Moisture Smart, Mucinex Sinus-Max, Nasal Relief, Blake-Synephrine 12-Hour, Blake-Synephrine Severe Sinus Congestion, Nostrilla Fast Relief, Sinex 12-Hour, Sudafed OM Sinus Cold Moisturizing, Sudafed OM Sinus Congestion Moisturizing, Vicks Qlearquil Decongestant, Vicks Sinex, Vicks Sinex Severe, Vicks Sinus Daytime, Zicam Extreme Congestion Relief, Zicam Intense Sinus  What should I tell my health care provider before I take this medicine?  They need to know if you have any of these conditions:  -diabetes  -glaucoma  -heart disease  -high or low blood pressure  -history of stroke  -Raynaud's phenomenon  -scleroderma  -Sjogren's syndrome  -thromboangiitis obliterans  -thyroid disease  -trouble urinating due to an enlarged prostate gland  -an unusual or allergic reaction to oxymetazoline, other medicines, foods, dyes, or preservatives  -pregnant or trying to get pregnant  -breast-feeding  How should I use this medicine?  This medicine is for use in the nose. Do not  take by mouth. Follow the directions on the package label. Shake well before using. Use your medicine at regular intervals or as directed by your health care provider. Do not use it more often than directed. Do not use for more than 3 days in a row without advice. Make sure that you are using your nasal spray correctly. Ask your doctor or health care provider if you have any questions.  Talk to your pediatrician regarding the use of this medicine in children. While this drug may be prescribed for children as young as 6 years for selected conditions, precautions do apply.  Overdosage: If you think you have taken too much of this medicine contact a poison control center or emergency room at once.  NOTE: This medicine is only for you. Do not share this medicine with others.  What if I miss a dose?  If you miss a dose, use it as soon as you can. If it is almost time for your next dose, use only that dose. Do not use double or extra doses.  What may interact with this medicine?  The medicine may interaction with the following medications:  -MAOIs like isocarboxazid, phenelzine, rasagiline, selegiline, and tranylcypromine  -medicines to treat blood pressure and heart disease like ace-inhibitors, beta-blockers, calcium-channel blockers, digoxin, and diuretics  -medicines to treat enlarged prostate like alfuzosin, doxazosin, prazosin, and terazosin  -nafarelin  This list may not describe all possible interactions. Give your health care provider a list of all the medicines, herbs, non-prescription drugs, or dietary supplements you use. Also tell them if you smoke, drink alcohol, or use illegal drugs. Some items may interact with your medicine.  What should I watch for while using this medicine?  Tell your doctor or health care professional if your symptoms do not start to get better or if they get worse.  To prevent the spread of infection, do not share bottle with anyone else.  What side effects may I notice from receiving  this medicine?  Side effects that you should report to your doctor or health care professional as soon as possible:  -allergic reactions like skin rash, itching or hives, swelling of the face, lips, or tongue  Side effects that usually do not require medical attention (report to your doctor or health care professional if they continue or are bothersome):  -burning, stinging, or irritation in the nose right after use  -increased nasal discharge  -sneezing  This list may not describe all possible side effects. Call your doctor for medical advice about side effects. You may report side effects to FDA at 8-054-NNF-9980.  Where should I keep my medicine?  Keep out of the reach of children.  Store at room temperature between 20 and 25 degrees C (68 and 77 degrees F). Throw away any unused medicine after the expiration date.  NOTE: This sheet is a summary. It may not cover all possible information. If you have questions about this medicine, talk to your doctor, pharmacist, or health care provider.  © 2018 Elsevier/Gold Standard (2017-02-09 13:48:04)      Depression / Suicide Risk    As you are discharged from this [facility] facility, it is important to learn how to keep safe from harming yourself.    Recognize the warning signs:  · Abrupt changes in personality, positive or negative- including increase in energy   · Giving away possessions  · Change in eating patterns- significant weight changes-  positive or negative  · Change in sleeping patterns- unable to sleep or sleeping all the time   · Unwillingness or inability to communicate  · Depression  · Unusual sadness, discouragement and loneliness  · Talk of wanting to die  · Neglect of personal appearance   · Rebelliousness- reckless behavior  · Withdrawal from people/activities they love  · Confusion- inability to concentrate     If you or a loved one observes any of these behaviors or has concerns about self-harm, here's what you can do:  · Talk about it- your  feelings and reasons for harming yourself  · Remove any means that you might use to hurt yourself (examples: pills, rope, extension cords, firearm)  · Get professional help from the community (Mental Health, Substance Abuse, psychological counseling)  · Do not be alone:Call your Safe Contact- someone whom you trust who will be there for you.  · Call your local CRISIS HOTLINE 292-3562 or 504-367-4387  · Call your local Children's Mobile Crisis Response Team [place] (798) 032-6547 or www.Tappr  · Call the toll free National Suicide Prevention Hotlines   · National Suicide Prevention Lifeline 500-978-BVZR (8697)  · National Hope Line Network 800-SUICIDE (262-4771)

## 2018-03-22 ENCOUNTER — OFFICE VISIT (OUTPATIENT)
Dept: HEMATOLOGY ONCOLOGY | Facility: MEDICAL CENTER | Age: 38
End: 2018-03-22
Payer: COMMERCIAL

## 2018-03-22 VITALS
OXYGEN SATURATION: 99 % | WEIGHT: 126.43 LBS | BODY MASS INDEX: 21.07 KG/M2 | TEMPERATURE: 98.2 F | HEIGHT: 65 IN | DIASTOLIC BLOOD PRESSURE: 64 MMHG | SYSTOLIC BLOOD PRESSURE: 114 MMHG | RESPIRATION RATE: 16 BRPM | HEART RATE: 113 BPM

## 2018-03-22 DIAGNOSIS — C30.0 SQUAMOUS CELL CARCINOMA OF NASAL CAVITY (HCC): ICD-10-CM

## 2018-03-22 PROCEDURE — 99205 OFFICE O/P NEW HI 60 MIN: CPT | Performed by: INTERNAL MEDICINE

## 2018-03-22 ASSESSMENT — PAIN SCALES - GENERAL: PAINLEVEL: 7=MODERATE-SEVERE PAIN

## 2018-03-23 ENCOUNTER — HOSPITAL ENCOUNTER (OUTPATIENT)
Dept: RADIATION ONCOLOGY | Facility: MEDICAL CENTER | Age: 38
End: 2018-03-31
Attending: RADIOLOGY
Payer: COMMERCIAL

## 2018-03-23 VITALS
WEIGHT: 124 LBS | SYSTOLIC BLOOD PRESSURE: 120 MMHG | HEART RATE: 103 BPM | RESPIRATION RATE: 18 BRPM | HEIGHT: 65 IN | DIASTOLIC BLOOD PRESSURE: 70 MMHG | TEMPERATURE: 98.7 F | OXYGEN SATURATION: 98 % | BODY MASS INDEX: 20.66 KG/M2

## 2018-03-23 DIAGNOSIS — C30.0 SQUAMOUS CELL CARCINOMA OF NASAL CAVITY (HCC): ICD-10-CM

## 2018-03-23 PROCEDURE — 31575 DIAGNOSTIC LARYNGOSCOPY: CPT | Performed by: RADIOLOGY

## 2018-03-23 PROCEDURE — 99214 OFFICE O/P EST MOD 30 MIN: CPT | Mod: 25 | Performed by: RADIOLOGY

## 2018-03-23 PROCEDURE — 99205 OFFICE O/P NEW HI 60 MIN: CPT | Mod: 25 | Performed by: RADIOLOGY

## 2018-03-23 NOTE — CONSULTS
RADIATION ONCOLOGY CONSULT    DATE OF SERVICE:   3/23/2018    IDENTIFICATION:   A 38 y.o. female with Cancer Staging  Squamous cell carcinoma of nasal cavity (CMS-[facility])  Staging form: Nasal Cavity and Ethmoid Sinus, AJCC 8th Edition  - Clinical stage from 3/23/2018: Stage III (cT3, cN0, cM0) - Signed by Tommy DENG M.D. 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 She gives a one-year history of complaints of sinusitis, right nasal blockage, and epistaxis. Patient states that she was tried on multiple courses of allergy medications without improvement. She requested ENT evaluation and  finally evaluated by Dr. Carvajal and noted large mass in the nasal cavity.    MRI of the nasal cavity demonstrated a 6.6 cm enhancing mass displacing the nasal septum left and extending to the medial wall maxillary sinus with possible invasion.    PET CT confirmed similar findings without  evidence of lymphadenopathy.    To stay in the OR and underwent resection of right nasal cavity squamous cell carcinoma, right inferior turbinectomy, right maxillary antrostomy with tissue removal, right sphenoethmoidectomy, right frontal sinusotomy, and septoplasty. Op note indicated apparent tumor attachment at the right inferior turbinate and along the medial wall of the right maxillary sinus. There was no extension to paranasal sinuses.    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 Right inferior turbinate showed benign bone and sinus mucosal tissue, the right uncinate and right ethmoid sphenoid and frontal sinus contents contained benign bone and sinus mucosal tissue.    She is now approximately 2 weeks postop and doing really well. Denies any pain or epistaxis. She does report mucoid drainage from her nose. Does  daily nasal irrigation.     PAST MEDICAL HISTORY:   Past Medical History:   Diagnosis Date   • Anemia 2017   • Breath shortness 2018   • Dental disorder    • Head and neck cancer (CMS-HCC)     squamous cell carcinoma   • Pain     back pain, left sided   • Pernicious anemia        PAST SURGICAL HISTORY:  Past Surgical History:   Procedure Laterality Date   • SEPTOPLASTY N/A 3/12/2018    Procedure: SEPTOPLASTY;  Surgeon: Andra Carvajal M.D.;  Location: SURGERY SAME DAY [facility];  Service: Ent   • ANTROSTOMY Right 3/12/2018    Procedure: ANTROSTOMY- MAXILLARY W/TISSUE REMOVAL;  Surgeon: Andra Carvajal M.D.;  Location: SURGERY SAME DAY [facility];  Service: Ent   • ETHMOIDECTOMY Right 3/12/2018    Procedure: ETHMOIDECTOMY- TOTAL;  Surgeon: Andra Carvajal M.D.;  Location: SURGERY SAME DAY [facility];  Service: Ent   • SPHENOIDECTOMY Right 3/12/2018    Procedure: SPHENOIDECTOMY;  Surgeon: Andra Carvajal M.D.;  Location: SURGERY SAME DAY [facility];  Service: Ent   • SINUSOTOMIES Right 3/12/2018    Procedure: SINUSOTOMIES- FRONTAL & ORBITAL DECOMPRESSION;  Surgeon: Andra Carvajal M.D.;  Location: SURGERY SAME DAY [facility];  Service: Ent   • OTHER NEUROLOGICAL SURG      bells palsy       CURRENT MEDICATIONS:  Current Outpatient Prescriptions   Medication Sig Dispense Refill   • predniSONE (DELTASONE) 10 MG Tab 30 mg daily for 2 days, 20 mg daily for 3 days, 10 mg daily for 3 days 15 Tab 0   • Probiotic Product (PROBIOTIC DAILY PO) Take  by mouth every day.     • Omega 3-6-9 Fatty Acids (OMEGA-3-6-9 PO) Take  by mouth.     • SPIRULINA PO Take  by mouth every day.       No current facility-administered medications for this encounter.        ALLERGIES:  Levaquin    FAMILY HISTORY:    Paternal GM - Stomach cancer    SOCIAL HISTORY:     reports that she has never smoked. She has never used smokeless tobacco. She reports that she does not drink alcohol or use drugs.   Patient is , has 3 children and  lives in [place], [place]. Patient does not work.     REVIEW OF SYSTEMS:    A review of systems for today's date of service was reviewed and uploaded into the electronic medical record.   Gynecological History:  Last Menstrual Cycle: 3/2/18  Menses Start Age: 11   & Para:   : 3, Para: 3 and Number of Interrupted Pregnancies: 0    Pain Scale: 0-10  Pain Assessement: 7  Pain Location, Orientation and Scale: frontal headache  What makes the pain better: tylenol/advil (pt says she does not believe in taking medications)  What makes the pain worse: per pt nothing makes it worse      PHYSICAL EXAM:   ECOG PERFORMANCE STATUS:  0= Fully active, able to carry on all pre-disease performance without restriction.   GENERAL: Alert and oriented distress  HEENT:  Pupils are equal, round, and reactive to light.  Extraocular muscles   are intact. Sclerae nonicteric.  Conjunctivae pink.  Oral cavity, tongue   protrudes midline. Dentition good  NECK:  Supple without evidence of thyromegaly.  NODES:  No peripheral adenopathy of the neck, supraclavicular fossa or axillae   bilaterally.  LUNGS:  Clear to ascultation and resonant to percussion.  HEART:  Regular rate and rhythm.  No murmur appreciated  ABDOMEN:  Soft. No evidence of hepatosplenomegaly.  Positive bowel sounds.  EXTREMITIES:  Without Edema.  NEUROLOGIC:  Cranial nerves II through XII were intact.  Strength is 5/5 in   lower extremities bilaterally.  DTRs were symmetrical.  There was no focal   sensory deficit appreciated.      FLEXIBLE FIBEROPTIC EXAM:  See separate report. Postoperative changes nasal cavity with missing nasal septum. Dried blood, mucus and granulation tissue noted in the nasal cavity.      LABORATORY DATA:   Lab Results   Component Value Date/Time    SODIUM 139 2018 10:02 PM    POTASSIUM 3.5 (L) 2018 10:02 PM    CHLORIDE 105 2018 10:02 PM    CO2 31 2018 10:02 PM    GLUCOSE 98 2018 10:02 PM    BUN 8 2018 10:02 PM     CREATININE 0.49 (L) 01/02/2018 10:02 PM     Lab Results   Component Value Date/Time    ALKPHOSPHAT 60 01/02/2018 10:02 PM    ASTSGOT 13 01/02/2018 10:02 PM    ALTSGPT 10 01/02/2018 10:02 PM    TBILIRUBIN 0.2 01/02/2018 10:02 PM      Lab Results   Component Value Date/Time    WBC 4.0 (L) 03/07/2018 09:44 AM    RBC 4.51 03/07/2018 09:44 AM    HEMOGLOBIN 13.1 03/07/2018 09:44 AM    HEMATOCRIT 39.7 03/07/2018 09:44 AM    MCV 88.0 03/07/2018 09:44 AM    MCH 29.0 03/07/2018 09:44 AM    MCHC 33.0 (L) 03/07/2018 09:44 AM    MPV 11.5 03/07/2018 09:44 AM    NEUTSPOLYS 72.70 (H) 01/02/2018 10:02 PM    LYMPHOCYTES 20.80 (L) 01/02/2018 10:02 PM    MONOCYTES 5.00 01/02/2018 10:02 PM    EOSINOPHILS 0.70 01/02/2018 10:02 PM    BASOPHILS 0.40 01/02/2018 10:02 PM    HYPOCHROMIA 1+ 11/13/2012 10:55 AM        RADIOLOGY DATA:  Mr-orbits,face,neck-with&w/o & Sequences Result Date: 2/26/2018  1.  There is an approximately 6.6 x 3.2 x 3.7 cm sized inhomogeneously enhancing soft tissue mass involving right-sided nasal cavity. Medially, this lesion causing mass effect upon the nasal septum which is deviated towards left side. Laterally, the mass  is extending through the right ostiomeatal unit into the medial portion of the left maxillary sinus. There is no evidence of orbital extension. There is no evidence of intracranial extension. The extension of the lesion is better visualized on the noncontrast T1-weighted sequences. This lesion is in keeping with the clinical diagnosis of nasal cavity carcinoma. 2.  Fluid collection in the right maxillary, ethmoid, frontal and sphenoid sinuses secondary to the nasal cavity obstruction.    Bb-wmmfp-[id number] Base To Mid-thigh Result Date: 3/9/2018  1.  Intense uptake within the RIGHT nasal cavity mass consistent with known cancer.  There is apparent extension into the RIGHT maxillary sinus.  Increased uptake within the adjacent inferomedial RIGHT orbit is likely physiologic although direct orbital  extension is difficult to exclude on PET imaging.  This is better assessed on MRI. 2.  No evidence to indicate local ez spread or distant metastatic disease.      IMPRESSION:    A 38 y.o. 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 She is status post resection.    RECOMMENDATIONS:   Reviewed imaging and path findings the patient. Considering the size and extent of the tumor I did recommend postop radiotherapy delivering [id number] cGy to the surgical bed with margin. Considering the tumor was limited to the nasal cavity as per operative report the risk of ez involvement should be low and elective ez irradiation will not be given.    We discussed IMRT based radiotherapy. This will involve 30-33 treatments delivered over 6 weeks. Treatments would be daily. We discussed the acute radiation effects associated with therapy including but not limited to: Xerostomia, dysgeusia, radiation dermatitis, eye irritation, tearing, possibility of radiation cataracts, and odynophagia. We discussed the importance of nutrition and maintaining fluid balance during therapy. We also discussed the importance of not having any treatment breaks during therapy. Measures to ameliorate radiation effects primarily are analgesics. We reviewed the importance of dental care  after completion of radiotherapy.     After a lengthy one hour face-to-face discussion patient wanting some time to make a decision. She states that she is afraid of radiation therapy.    I did recommend a follow-up MRI scan to assess residual disease. We will schedule this for approximately 4 weeks post completion of surgery. She'll return here for follow-up post imaging to discuss adjuvant radiotherapy. Ideally I like to get radiotherapy started within 6 weeks of surgery. Patient is aware.      Thank you for the opportunity to participate in her care.  If any questions or comments, please do not hesitate in  calling.    Tommy DENG M.D.  Electronically signed by: oTmmy Schwartz V, 3/23/2018 1:08 PM  [phone]

## 2018-03-23 NOTE — PROCEDURES
Fiberoptic Naso-pharyngoscopy Note      Flexible fiberoptic exam after anesthesia of left nostril and oropharynx  demostrated absence of nasal septum large open nasal space with granulation tissue dried mucus and blood. No obvious visible tumor.The oropharynx, the base of tongue, vallecula, epiglottis, PE folds appear normal.  Laryngeal structures, the AE folds, false vocal folds, arytenoids appeared normal.  Cords meet at midline on the phonation of vowel E.  Piriform sinuses showed no masses.    Tommy DENG M.D.  Electronically signed by: Tommy Schwartz V, 3/23/2018 1:36 PM  [phone]

## 2018-03-23 NOTE — ADDENDUM NOTE
Encounter addended by: Tommy DENG M.D. on: 3/23/2018  1:38 PM<BR>    Actions taken: Sign clinical note

## 2018-03-27 ENCOUNTER — PATIENT OUTREACH (OUTPATIENT)
Dept: OTHER | Facility: MEDICAL CENTER | Age: 38
End: 2018-03-27

## 2018-03-27 NOTE — PROGRESS NOTES
"On 3/27/2018 at 1036 this ONN called patient with use of  services, patient [place]ian speaking only.  ID # [id number]. This ONN introduced self and explained role of navigator as an educator and also to help patient remove barriers to care. This ONN asked if patient was having financial concerns about treatment. Patient stated she was having concerns about finances. Patient stated that making a decision on treatment was a difficult one. Patient stated she is afraid to move forward with radiation. Patient stated that she knows her health comes first but that patient does have concerns about the cost and therefore would like a second opinion to see if there are more affordable alternatives. Patient stated she would wait for the MRI results from scheduled MRI on 4/13/2018 before making a decision. Patient states patient's  makes too much money for family to qualify for Medicaid. Patient stated that they are a family of five living on solely her 's income. This ONN explained that this ONN could teach patient about radiation and possibly improve patient's understanding of radiation. Patient declined interest in this time stating that patient would wait for the MRI and then go \"step by step.\" Explained to patient that this ONN could also connect patient with a variety of community financial resources to help ease the burden of the cost of treatment. Patient stated she was grateful to hear from this ONN and patient stated she was happy for the call. Patient denies questions, concerns, or other needs at this time. This ONN provided patient with this ONN's phone number.     After speaking with patient this ONN notified PAMELA Chris of situation.   "

## 2018-04-04 ENCOUNTER — TELEPHONE (OUTPATIENT)
Dept: HEMATOLOGY ONCOLOGY | Facility: MEDICAL CENTER | Age: 38
End: 2018-04-04

## 2018-04-13 ENCOUNTER — HOSPITAL ENCOUNTER (OUTPATIENT)
Dept: RADIOLOGY | Facility: MEDICAL CENTER | Age: 38
End: 2018-04-13
Attending: RADIOLOGY
Payer: COMMERCIAL

## 2018-04-13 DIAGNOSIS — C30.0 SQUAMOUS CELL CARCINOMA OF NASAL CAVITY (HCC): ICD-10-CM

## 2018-04-13 PROCEDURE — A9579 GAD-BASE MR CONTRAST NOS,1ML: HCPCS | Performed by: RADIOLOGY

## 2018-04-13 PROCEDURE — 70543 MRI ORBT/FAC/NCK W/O &W/DYE: CPT

## 2018-04-13 PROCEDURE — 700117 HCHG RX CONTRAST REV CODE 255: Performed by: RADIOLOGY

## 2018-04-13 RX ADMIN — GADODIAMIDE 15 ML: 287 INJECTION INTRAVENOUS at 10:36

## 2018-04-17 ENCOUNTER — HOSPITAL ENCOUNTER (OUTPATIENT)
Dept: RADIATION ONCOLOGY | Facility: MEDICAL CENTER | Age: 38
End: 2018-04-30
Attending: RADIOLOGY
Payer: COMMERCIAL

## 2018-04-17 VITALS
WEIGHT: 128.3 LBS | HEART RATE: 74 BPM | TEMPERATURE: 98.4 F | DIASTOLIC BLOOD PRESSURE: 59 MMHG | BODY MASS INDEX: 21.35 KG/M2 | SYSTOLIC BLOOD PRESSURE: 120 MMHG | OXYGEN SATURATION: 99 %

## 2018-04-17 PROCEDURE — 99212 OFFICE O/P EST SF 10 MIN: CPT | Performed by: RADIOLOGY

## 2018-04-17 PROCEDURE — 99213 OFFICE O/P EST LOW 20 MIN: CPT | Performed by: RADIOLOGY

## 2018-04-17 ASSESSMENT — PAIN SCALES - GENERAL: PAINLEVEL: NO PAIN

## 2018-04-17 NOTE — PROGRESS NOTES
RADIATION ONCOLOGY FOLLOW-UP    DATE OF SERVICE:   4/17/2018    IDENTIFICATION:   A 38 y.o. female with Cancer Staging  Squamous cell carcinoma of nasal cavity (CMS-HCC)  Staging form: Nasal Cavity and Ethmoid Sinus, AJCC 8th Edition  - Clinical stage from 3/23/2018: Stage III (cT3, cN0, cM0) - Signed by Tommy DENG M.D. on 3/23/2018    HISTORY OF PRESENT ILLNESS:   Status post resection with clear margins of a stage III squamous cell carcinoma moderately differentiated on the right nasal cavity. Patient was initially seen in consultation March 23. Adjuvant radiotherapy recommended. Patient hesitant. Patient returns today after undergoing restaging MRI of the sinuses.    CURRENT MEDICATIONS:  Current Outpatient Prescriptions   Medication Sig Dispense Refill   • Omega 3-6-9 Fatty Acids (OMEGA-3-6-9 PO) Take  by mouth.     • predniSONE (DELTASONE) 10 MG Tab 30 mg daily for 2 days, 20 mg daily for 3 days, 10 mg daily for 3 days 15 Tab 0   • Probiotic Product (PROBIOTIC DAILY PO) Take  by mouth every day.     • SPIRULINA PO Take  by mouth every day.       No current facility-administered medications for this encounter.        ALLERGIES:  Levaquin    PHYSICAL EXAM:   /59   Pulse 74   Temp 36.9 °C (98.4 °F)   Wt 58.2 kg (128 lb 4.8 oz)   SpO2 99%   BMI 21.35 kg/m²   GENERAL: Alert and oriented distress    LABORATORY DATA:   Lab Results   Component Value Date/Time    SODIUM 139 01/02/2018 10:02 PM    POTASSIUM 3.5 (L) 01/02/2018 10:02 PM    CHLORIDE 105 01/02/2018 10:02 PM    CO2 31 01/02/2018 10:02 PM    GLUCOSE 98 01/02/2018 10:02 PM    BUN 8 01/02/2018 10:02 PM    CREATININE 0.49 (L) 01/02/2018 10:02 PM     Lab Results   Component Value Date/Time    ALKPHOSPHAT 60 01/02/2018 10:02 PM    ASTSGOT 13 01/02/2018 10:02 PM    ALTSGPT 10 01/02/2018 10:02 PM    TBILIRUBIN 0.2 01/02/2018 10:02 PM      Lab Results   Component Value Date/Time    WBC 4.0 (L) 03/07/2018 09:44 AM    RBC 4.51 03/07/2018 09:44 AM     HEMOGLOBIN 13.1 03/07/2018 09:44 AM    HEMATOCRIT 39.7 03/07/2018 09:44 AM    MCV 88.0 03/07/2018 09:44 AM    MCH 29.0 03/07/2018 09:44 AM    MCHC 33.0 (L) 03/07/2018 09:44 AM    MPV 11.5 03/07/2018 09:44 AM    NEUTSPOLYS 72.70 (H) 01/02/2018 10:02 PM    LYMPHOCYTES 20.80 (L) 01/02/2018 10:02 PM    MONOCYTES 5.00 01/02/2018 10:02 PM    EOSINOPHILS 0.70 01/02/2018 10:02 PM    BASOPHILS 0.40 01/02/2018 10:02 PM    HYPOCHROMIA 1+ 11/13/2012 10:55 AM        RADIOLOGY DATA:  Mr-orbits,face,neck-with&w/o & Sequences    Result Date: 4/13/2018 4/13/2018 10:33 AM HISTORY/REASON FOR EXAM:  1 month postop resection of right-sided nasal carcinoma. TECHNIQUE/EXAM DESCRIPTION: MRI of the orbits without and with contrast. The study was performed on a Jean Marie 1.5  Anisa MRI scanner. T2 fast spin-echo 2mm thin section axial, T1 axial, T1 post-contrast fat-suppressed axial,  STIR coronal, T1 coronal, T1 fat suppressed pre-contrast coronal, and T1 post-contrast fat-suppressed coronal images were obtained of the orbits.  T1 sagittal, T2  axial, and FLAIR coronal images were obtained of the whole brain. 13 mL Omniscan contrast was administered intravenously. COMPARISON: None. FINDINGS: There is a large postoperative cavity involving the nasal cavity, right ethmoid sinus, and  and right maxillary sinus. And adjacent right maxillary sinus. There is moderate amount of surrounding increased T2 signal intensity involving the remainder of the  sphenoid sinus, ethmoid sinuses, frontal sinuses and maxillary sinuses. There is a polypoid region of increased T2 signal intensity inferiorly in the left maxillary sinus. There is no evidence of residual recurrent mass in the region of the right ethmoid or maxillary sinuses. There is no evidence of abnormal enhancement or mass lesion involving the skull base. The orbits have a normal appearance and enhancement pattern. There is no evidence of orbital proptosis or intra or extraconal mass. There is no  evidence of abnormal enhancement are mass involving the intracranial structures. .    1.  Large postsurgical defect involving the nasal cavity, right ethmoid sinus, and right maxillary sinus. 2.  There is no evidence of recurrent or residual neoplasm. 3.  Chronic pansinusitis.      IMPRESSION:    A 38 y.o. 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 She is status post resection.    RECOMMENDATIONS:   Reviewed imaging with patient and did strongly recommend adjuvant radiotherapy within the 6 week postoperative window for prevention of local recurrence. Patient declined. She is concerned about post radiotherapy effects. She is interested in alternative medicine. She states that she'll keep close follow-up with Dr. Carvajal. At this point I'll schedule no further follow-ups in the department and will see her on an as-needed basis. Thank you for the opportunity to participate in her care.    20 minutes was spent face-to-face with patient in the office and more than half of that time was spent counseling patient or coordinating care as described above.    Tommy DENG M.D.  Electronically signed by: Tommy Schwartz V, 4/17/2018 11:07 AM  [phone]

## 2018-04-17 NOTE — NON-PROVIDER
Patient was seen today in clinic with Dr. Schwartz for follow up.  Vitals signs and weight were obtained and pain assessment was completed.  Allergies and medications were reviewed with the patient.     Vitals/Pain:  Vitals:    04/17/18 0955   BP: 120/59   Pulse: 74   Temp: 36.9 °C (98.4 °F)   SpO2: 99%   Weight: 58.2 kg (128 lb 4.8 oz)   Pain Score: No pain        Allergies:   Levaquin    Current Medications:  Current Outpatient Prescriptions   Medication Sig Dispense Refill   • Omega 3-6-9 Fatty Acids (OMEGA-3-6-9 PO) Take  by mouth.     • predniSONE (DELTASONE) 10 MG Tab 30 mg daily for 2 days, 20 mg daily for 3 days, 10 mg daily for 3 days 15 Tab 0   • Probiotic Product (PROBIOTIC DAILY PO) Take  by mouth every day.     • SPIRULINA PO Take  by mouth every day.       No current facility-administered medications for this encounter.          PCP:  Michelle Najera, Med Ass't

## 2018-04-20 ENCOUNTER — DOCUMENTATION (OUTPATIENT)
Dept: OTHER | Facility: MEDICAL CENTER | Age: 38
End: 2018-04-20

## 2018-04-20 NOTE — PROGRESS NOTES
Late entry-     On 4/17/2018 at 1500 this ONN and SW Radha Chris had appointment with patient for psychosocial assessment. Patient did not arrive. Per radiation staff and Dr. Schwartz's progress note patient is declining treatment at this time.

## 2018-05-16 ENCOUNTER — TELEPHONE (OUTPATIENT)
Dept: HEMATOLOGY ONCOLOGY | Facility: MEDICAL CENTER | Age: 38
End: 2018-05-16

## 2018-12-10 ENCOUNTER — HOSPITAL ENCOUNTER (OUTPATIENT)
Dept: RADIOLOGY | Facility: MEDICAL CENTER | Age: 38
End: 2018-12-10
Attending: OTOLARYNGOLOGY
Payer: COMMERCIAL

## 2018-12-10 DIAGNOSIS — C30.0 MALIGNANT NEOPLASM OF NASAL CAVITIES (HCC): ICD-10-CM

## 2018-12-10 PROCEDURE — A9552 F18 FDG: HCPCS

## 2019-04-18 ENCOUNTER — HOSPITAL ENCOUNTER (OUTPATIENT)
Dept: RADIOLOGY | Facility: MEDICAL CENTER | Age: 39
End: 2019-04-18
Attending: OTOLARYNGOLOGY
Payer: COMMERCIAL

## 2019-04-18 DIAGNOSIS — R22.1 NECK MASS: ICD-10-CM

## 2019-04-18 PROCEDURE — 700117 HCHG RX CONTRAST REV CODE 255: Performed by: OTOLARYNGOLOGY

## 2019-04-18 PROCEDURE — 70491 CT SOFT TISSUE NECK W/DYE: CPT

## 2019-04-18 RX ADMIN — IOHEXOL 80 ML: 350 INJECTION, SOLUTION INTRAVENOUS at 15:45

## 2020-06-28 NOTE — PROGRESS NOTES
Consult Note: Oncology    Date of consultation: 3/22/2018 9:32 AM    Referring provider: Gerry Varela M.D.    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 As you know she  is a 38 y.o. year old  never smoker female with chronic complaints of sinus congestion and nasal obstruction over the past 18 months or so with significant worsening over the past 4 months.    ENT exam revealed a large right nasal mass filling the anterior nasal cavity, not involving the nasal soft tissue envelope. Near complete obstruction of the left nasal cavity from bowing of the septum. No obvious septal involvement.    Biopsy  - basaloid squamous cell carcinoma.      2/26/18-MRI of the orbit/face-  6.6 x 3.2 x 3.7 cm sized inhomogeneously enhancing soft tissue mass involving right-sided nasal cavity. Medially, this lesion causing mass effect upon the nasal septum which is deviated towards left side. Laterally, the mass   is extending through the right ostiomeatal unit into the medial portion of the left maxillary sinus. There is no evidence of orbital extension. There is no evidence of intracranial extension.    -3/9/18-PET scan-Intense uptake within the RIGHT nasal cavity mass consistent with known cancer.  There is apparent extension into the RIGHT maxillary sinus.  No evidence to indicate local ez spread or distant metastatic disease    3/13/18- s/p   Resection of right nasal cavity squamous cell carcinoma.2.  Right inferior turbinectomy to facilitate kerline maxillary antrostomy.3.  Right maxillary antrostomy with tissue removal.4.  Right sphenoethmoidectomy.  5.  Right frontal sinusotomy.6.  Septoplasty.    Pathology- Right sinus contents:         Positive for grade 2 basaloid squamous cell carcinoma with necrosis and          fibrosis noted.  B. Right  nasal mass:         Positive for basaloid squamous cell carcinoma with necrosis and          fibrosis noted.  IHC study for p16, a marker for HPV, is          positive.    No malignancy identified in the right inferior turbinate, right uncinate, right ethmoid, sphenoid and frontal sinus contents, and nasal septum. No lymphovascular or perineural invasion.     She is currently feeling better. She has some postnasal drip. No fever, chills or other acute symptoms.    Past Medical History:    Past Medical History:   Diagnosis Date   • Anemia 2017   • Breath shortness 2018   • Dental disorder    • Pain     back pain, left sided   • Pernicious anemia        Past surgical history:    Past Surgical History:   Procedure Laterality Date   • SEPTOPLASTY N/A 3/12/2018    Procedure: SEPTOPLASTY;  Surgeon: Andra Carvajal M.D.;  Location: SURGERY SAME DAY [facility];  Service: Ent   • ANTROSTOMY Right 3/12/2018    Procedure: ANTROSTOMY- MAXILLARY W/TISSUE REMOVAL;  Surgeon: Andra Carvajal M.D.;  Location: SURGERY SAME DAY [facility];  Service: Ent   • ETHMOIDECTOMY Right 3/12/2018    Procedure: ETHMOIDECTOMY- TOTAL;  Surgeon: Andra Carvajal M.D.;  Location: SURGERY SAME DAY [facility];  Service: Ent   • SPHENOIDECTOMY Right 3/12/2018    Procedure: SPHENOIDECTOMY;  Surgeon: Andra Carvajal M.D.;  Location: SURGERY SAME DAY [facility];  Service: Ent   • SINUSOTOMIES Right 3/12/2018    Procedure: SINUSOTOMIES- FRONTAL & ORBITAL DECOMPRESSION;  Surgeon: Andra Carvajal M.D.;  Location: SURGERY SAME DAY [facility];  Service: Ent   • OTHER NEUROLOGICAL SURG      bells palsy       Allergies:  Levaquin    Medications:    Current Outpatient Prescriptions   Medication Sig Dispense Refill   • Probiotic Product (PROBIOTIC DAILY PO) Take  by mouth every day.     • Omega 3-6-9 Fatty Acids (OMEGA-3-6-9 PO) Take  by mouth.     • SPIRULINA PO Take  by mouth every day.     • predniSONE (DELTASONE) 10 MG Tab 30 mg daily for 2 days, 20  "mg daily for 3 days, 10 mg daily for 3 days 15 Tab 0     No current facility-administered medications for this visit.        Social History:     Social History     Social History   • Marital status:      Spouse name: N/A   • Number of children: N/A   • Years of education: N/A     Occupational History   • Not on file.     Social History Main Topics   • Smoking status: Never Smoker   • Smokeless tobacco: Never Used   • Alcohol use No   • Drug use: No   • Sexual activity: Not on file     Other Topics Concern   • Not on file     Social History Narrative   • No narrative on file       Family History:   History reviewed. No pertinent family history.    Review of Systems:  All other review of systems are negative except what was mentioned above in the HPI.    Constitutional: No fever, chills, weight loss ,malaise/fatigue.    HEENT: No new auditory or visual complaints. As above      Respiratory:No new cough, sputum production, shortness of breath and wheezing.    Cardiovascular: No new chest pain, palpitations, orthopnea and leg swelling.    Gastrointestinal: No heartburn, nausea, vomiting ,abdominal pain, hematochezia or melena     Genitourinary: Negative for dysuria, hematuria    Musculoskeletal: No new arthralgias or myalgias   Skin: Negative for rash and itching.    Neurological: Negative for focal weakness or headaches.    Endo/Heme/Allergies: No abnormal bleed/bruise.    Psychiatric/Behavioral: No new depression/anxiety.    Physical Exam:  Vitals:   /64   Pulse (!) 113   Temp 36.8 °C (98.2 °F)   Resp 16   Ht 1.651 m (5' 5\")   Wt 57.3 kg (126 lb 6.9 oz)   LMP 02/22/2018   SpO2 99%   BMI 21.04 kg/m²      General: Not in acute distress, alert and oriented x 3  HEENT: No pallor, icterus. Oropharynx clear.   Neck: Supple, no palpable masses.  Lymph nodes: No palpable cervical, supraclavicular, axillary or inguinal lymphadenopathy.    CVS: regular rate and rhythm, no rubs or gallops  RESP: Clear to " auscultate bilaterally, no wheezing or crackles.   ABD: Soft, non tender, non distended, positive bowel sounds, no palpable organomegaly  EXT: No edema or cyanosis  CNS: Alert and oriented x3, No focal deficits.   Skin- No rash      Labs:   No results for input(s): RBC, HEMOGLOBIN, HEMATOCRIT, PLATELETCT, PROTHROMBTM, APTT, INR, IRON, FERRITIN, TOTIRONBC in the last 72 hours.  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 Clinic examination/ staging PET scan did not reveal any cervical lymphadenopathy . She teena around 10-20% chance of cervical lymph node involvement and an elective neck dissection may be reasonable to consider. Alternate option may be to include the lymph nodes field with adjuvant radiation.    Long discussion with the patient today. She is scheduled to see Dr. Schwartz tomorrow. She is definitely a candidate for adjuvant radiation. Informed the patient that at this point, I do not think there is enough clinical data to recommend adding chemotherapy to the radiation due to lack of gross residual disease , high-grade histology, positive margins, extracapsular ez extension, or multiple positive nodes.      I will discuss her case with Dr. Schwartz after her consultation with him about the need for elective neck dissection or any role for adding chemotherapy to the radiation.     The prognostic significance of p16 is rather unclear in the sinonasal squamous cell carcinoma. . There is some data saying that this may portend good prognosis. However ,  data from large trials are missing.       Complex patient requiring complex decision making. I have extensively reviewed her prior medical records as part of establishing care with me and formulated the plan. 70 minutes was spent today with the patient through the . 80% of the time spent reviewing the pathology and imaging results, role of adjuvant treatment including role of chemoradiation versus radiation with the patient. I will see her back if chemotherapy is considered to be    Please note that this dictation was created using voice recognition software. I have made every reasonable attempt to correct obvious errors, but I expect that there are errors of grammar and possibly content that I did not discover before finalizing the note.      SIGNATURES:  Daniel Champagne    CC:  ASHU Courtney Jeremy D, M.D.         IV discontinued, cath removed intact

## 2021-05-08 ENCOUNTER — HOSPITAL ENCOUNTER (OUTPATIENT)
Dept: LAB | Facility: MEDICAL CENTER | Age: 41
End: 2021-05-08
Attending: PHYSICIAN ASSISTANT
Payer: COMMERCIAL

## 2021-05-08 LAB
ALBUMIN SERPL BCP-MCNC: 4.5 G/DL (ref 3.2–4.9)
ALBUMIN/GLOB SERPL: 1.3 G/DL
ALP SERPL-CCNC: 80 U/L (ref 30–99)
ALT SERPL-CCNC: 14 U/L (ref 2–50)
ANION GAP SERPL CALC-SCNC: 10 MMOL/L (ref 7–16)
AST SERPL-CCNC: 16 U/L (ref 12–45)
BASOPHILS # BLD AUTO: 0.3 % (ref 0–1.8)
BASOPHILS # BLD: 0.02 K/UL (ref 0–0.12)
BILIRUB SERPL-MCNC: 0.4 MG/DL (ref 0.1–1.5)
BUN SERPL-MCNC: 14 MG/DL (ref 8–22)
CALCIUM SERPL-MCNC: 9.5 MG/DL (ref 8.5–10.5)
CHLORIDE SERPL-SCNC: 102 MMOL/L (ref 96–112)
CHOLEST SERPL-MCNC: 169 MG/DL (ref 100–199)
CO2 SERPL-SCNC: 23 MMOL/L (ref 20–33)
CREAT SERPL-MCNC: 0.57 MG/DL (ref 0.5–1.4)
EOSINOPHIL # BLD AUTO: 0.07 K/UL (ref 0–0.51)
EOSINOPHIL NFR BLD: 1.1 % (ref 0–6.9)
ERYTHROCYTE [DISTWIDTH] IN BLOOD BY AUTOMATED COUNT: 45.2 FL (ref 35.9–50)
EST. AVERAGE GLUCOSE BLD GHB EST-MCNC: 108 MG/DL
GLOBULIN SER CALC-MCNC: 3.5 G/DL (ref 1.9–3.5)
GLUCOSE SERPL-MCNC: 112 MG/DL (ref 65–99)
HBA1C MFR BLD: 5.4 % (ref 4–5.6)
HCT VFR BLD AUTO: 41.6 % (ref 37–47)
HDLC SERPL-MCNC: 48 MG/DL
HGB BLD-MCNC: 13.3 G/DL (ref 12–16)
IMM GRANULOCYTES # BLD AUTO: 0.02 K/UL (ref 0–0.11)
IMM GRANULOCYTES NFR BLD AUTO: 0.3 % (ref 0–0.9)
LDLC SERPL CALC-MCNC: 97 MG/DL
LYMPHOCYTES # BLD AUTO: 1.28 K/UL (ref 1–4.8)
LYMPHOCYTES NFR BLD: 20.1 % (ref 22–41)
MCH RBC QN AUTO: 27.5 PG (ref 27–33)
MCHC RBC AUTO-ENTMCNC: 32 G/DL (ref 33.6–35)
MCV RBC AUTO: 86.1 FL (ref 81.4–97.8)
MONOCYTES # BLD AUTO: 0.39 K/UL (ref 0–0.85)
MONOCYTES NFR BLD AUTO: 6.1 % (ref 0–13.4)
NEUTROPHILS # BLD AUTO: 4.59 K/UL (ref 2–7.15)
NEUTROPHILS NFR BLD: 72.1 % (ref 44–72)
NRBC # BLD AUTO: 0 K/UL
NRBC BLD-RTO: 0 /100 WBC
PLATELET # BLD AUTO: 250 K/UL (ref 164–446)
PMV BLD AUTO: 11.8 FL (ref 9–12.9)
POTASSIUM SERPL-SCNC: 3.6 MMOL/L (ref 3.6–5.5)
PROT SERPL-MCNC: 8 G/DL (ref 6–8.2)
RBC # BLD AUTO: 4.83 M/UL (ref 4.2–5.4)
SODIUM SERPL-SCNC: 135 MMOL/L (ref 135–145)
TRIGL SERPL-MCNC: 118 MG/DL (ref 0–149)
WBC # BLD AUTO: 6.4 K/UL (ref 4.8–10.8)

## 2021-05-08 PROCEDURE — 85025 COMPLETE CBC W/AUTO DIFF WBC: CPT

## 2021-05-08 PROCEDURE — 80061 LIPID PANEL: CPT

## 2021-05-08 PROCEDURE — 36415 COLL VENOUS BLD VENIPUNCTURE: CPT

## 2021-05-08 PROCEDURE — 82306 VITAMIN D 25 HYDROXY: CPT

## 2021-05-08 PROCEDURE — 80053 COMPREHEN METABOLIC PANEL: CPT

## 2021-05-08 PROCEDURE — 83036 HEMOGLOBIN GLYCOSYLATED A1C: CPT

## 2021-05-10 LAB — 25(OH)D3 SERPL-MCNC: 16 NG/ML (ref 30–80)

## 2021-10-23 ENCOUNTER — HOSPITAL ENCOUNTER (OUTPATIENT)
Dept: LAB | Facility: MEDICAL CENTER | Age: 41
End: 2021-10-23
Attending: NURSE PRACTITIONER
Payer: COMMERCIAL

## 2021-10-23 LAB
25(OH)D3 SERPL-MCNC: 20 NG/ML (ref 30–100)
ALBUMIN SERPL BCP-MCNC: 4.5 G/DL (ref 3.2–4.9)
ALBUMIN/GLOB SERPL: 1.5 G/DL
ALP SERPL-CCNC: 70 U/L (ref 30–99)
ALT SERPL-CCNC: 13 U/L (ref 2–50)
ANION GAP SERPL CALC-SCNC: 11 MMOL/L (ref 7–16)
AST SERPL-CCNC: 16 U/L (ref 12–45)
BASOPHILS # BLD AUTO: 0.4 % (ref 0–1.8)
BASOPHILS # BLD: 0.02 K/UL (ref 0–0.12)
BILIRUB SERPL-MCNC: 0.6 MG/DL (ref 0.1–1.5)
BUN SERPL-MCNC: 9 MG/DL (ref 8–22)
CALCIUM SERPL-MCNC: 9.4 MG/DL (ref 8.5–10.5)
CHLORIDE SERPL-SCNC: 104 MMOL/L (ref 96–112)
CHOLEST SERPL-MCNC: 170 MG/DL (ref 100–199)
CO2 SERPL-SCNC: 22 MMOL/L (ref 20–33)
CREAT SERPL-MCNC: 0.49 MG/DL (ref 0.5–1.4)
CRP SERPL HS-MCNC: 0.35 MG/DL (ref 0–0.75)
EOSINOPHIL # BLD AUTO: 0.05 K/UL (ref 0–0.51)
EOSINOPHIL NFR BLD: 0.9 % (ref 0–6.9)
ERYTHROCYTE [DISTWIDTH] IN BLOOD BY AUTOMATED COUNT: 45.7 FL (ref 35.9–50)
ERYTHROCYTE [SEDIMENTATION RATE] IN BLOOD BY WESTERGREN METHOD: 18 MM/HOUR (ref 0–25)
GLOBULIN SER CALC-MCNC: 3.1 G/DL (ref 1.9–3.5)
GLUCOSE SERPL-MCNC: 108 MG/DL (ref 65–99)
HCT VFR BLD AUTO: 40.8 % (ref 37–47)
HCYS SERPL-SCNC: 6.55 UMOL/L
HDLC SERPL-MCNC: 41 MG/DL
HGB BLD-MCNC: 13 G/DL (ref 12–16)
IMM GRANULOCYTES # BLD AUTO: 0.02 K/UL (ref 0–0.11)
IMM GRANULOCYTES NFR BLD AUTO: 0.4 % (ref 0–0.9)
LDLC SERPL CALC-MCNC: 101 MG/DL
LYMPHOCYTES # BLD AUTO: 1.33 K/UL (ref 1–4.8)
LYMPHOCYTES NFR BLD: 23.4 % (ref 22–41)
MCH RBC QN AUTO: 27.7 PG (ref 27–33)
MCHC RBC AUTO-ENTMCNC: 31.9 G/DL (ref 33.6–35)
MCV RBC AUTO: 87 FL (ref 81.4–97.8)
MONOCYTES # BLD AUTO: 0.3 K/UL (ref 0–0.85)
MONOCYTES NFR BLD AUTO: 5.3 % (ref 0–13.4)
NEUTROPHILS # BLD AUTO: 3.97 K/UL (ref 2–7.15)
NEUTROPHILS NFR BLD: 69.6 % (ref 44–72)
NRBC # BLD AUTO: 0 K/UL
NRBC BLD-RTO: 0 /100 WBC
PLATELET # BLD AUTO: 247 K/UL (ref 164–446)
PMV BLD AUTO: 11.7 FL (ref 9–12.9)
POTASSIUM SERPL-SCNC: 3.9 MMOL/L (ref 3.6–5.5)
PROT SERPL-MCNC: 7.6 G/DL (ref 6–8.2)
RBC # BLD AUTO: 4.69 M/UL (ref 4.2–5.4)
SODIUM SERPL-SCNC: 137 MMOL/L (ref 135–145)
TRIGL SERPL-MCNC: 141 MG/DL (ref 0–149)
TSH SERPL DL<=0.005 MIU/L-ACNC: 3.73 UIU/ML (ref 0.38–5.33)
WBC # BLD AUTO: 5.7 K/UL (ref 4.8–10.8)

## 2021-10-23 PROCEDURE — 86140 C-REACTIVE PROTEIN: CPT

## 2021-10-23 PROCEDURE — 83921 ORGANIC ACID SINGLE QUANT: CPT

## 2021-10-23 PROCEDURE — 85652 RBC SED RATE AUTOMATED: CPT

## 2021-10-23 PROCEDURE — 36415 COLL VENOUS BLD VENIPUNCTURE: CPT

## 2021-10-23 PROCEDURE — 83090 ASSAY OF HOMOCYSTEINE: CPT

## 2021-10-23 PROCEDURE — 82306 VITAMIN D 25 HYDROXY: CPT

## 2021-10-23 PROCEDURE — 80061 LIPID PANEL: CPT

## 2021-10-23 PROCEDURE — 84443 ASSAY THYROID STIM HORMONE: CPT

## 2021-10-23 PROCEDURE — 80053 COMPREHEN METABOLIC PANEL: CPT

## 2021-10-23 PROCEDURE — 85025 COMPLETE CBC W/AUTO DIFF WBC: CPT

## 2021-10-26 LAB — METHYLMALONATE SERPL-SCNC: <0.1 UMOL/L (ref 0–0.4)

## 2023-03-29 NOTE — TELEPHONE ENCOUNTER
New Oncology Patient 48 hour follow up scripting:    Called patient to verify upcoming appointments. She is to have an MRI done and follow up with Dr. Koch after. I advised her to call our office if she has any further questions since she does not have a follow up with Dr. Champagne. Patient confirmed and agreed with plan of care and had no further questions or needs.  Encouraged patient to call Medical Oncology at [phone] with any further questions or concerns.      
Unsteadiness on feet

## (undated) DEVICE — GLOVE SZ 6.5 BIOGEL PI MICRO - PF LF (50PR/BX)

## (undated) DEVICE — SENSOR SPO2 NEO LNCS ADHESIVE (20/BX) SEE USER NOTES

## (undated) DEVICE — CANISTER SUCTION RIGID RED 1500CC (40EA/CA)

## (undated) DEVICE — ELECTRODE DUAL RETURN W/ CORD - (50/PK)

## (undated) DEVICE — TUBE CONNECTING SUCTION - CLEAR PLASTIC STERILE 72 IN (50EA/CA)

## (undated) DEVICE — HEAD HOLDER JUNIOR/ADULT

## (undated) DEVICE — GOWN SURGEONS LARGE - (32/CA)

## (undated) DEVICE — SYRINGE DISP. 60 CC LL - (30/BX, 12BX/CA)**WHEN THESE ARE GONE ORDER #500206**

## (undated) DEVICE — ELECTRODE 850 FOAM ADHESIVE - HYDROGEL RADIOTRNSPRNT (50/PK)

## (undated) DEVICE — PROTECTOR ULNA NERVE - (36PR/CA)

## (undated) DEVICE — NEPTUNE 4 PORT MANIFOLD - (20/PK)

## (undated) DEVICE — KIT  I.V. START (100EA/CA)

## (undated) DEVICE — CLOSURE SKIN STRIP 1/2 X 4 IN - (STERI STRIP) (50/BX 4BX/CA)

## (undated) DEVICE — SUTURE 5-0 PLAIN GUT PC-1 - (12/BX)

## (undated) DEVICE — GLOVE BIOGEL SZ 6.5 SURGICAL PF LTX (50PR/BX 4BX/CA)

## (undated) DEVICE — KIT ANESTHESIA W/CIRCUIT & 3/LT BAG W/FILTER (20EA/CA)

## (undated) DEVICE — CONTAINER SPECIMEN BAG OR - STERILE 4 OZ W/LID (100EA/CA)

## (undated) DEVICE — SHEATH SHARP SITE 30 DEGREE ENDOSCRUB II (5EA/PK)

## (undated) DEVICE — CANISTER SUCTION 3000ML MECHANICAL FILTER AUTO SHUTOFF MEDI-VAC NONSTERILE LF DISP  (40EA/CA)

## (undated) DEVICE — SODIUM CHL IRRIGATION 0.9% 1000ML (12EA/CA)

## (undated) DEVICE — SOD. CHL. INJ. 0.9% 1000 ML - (14EA/CA 60CA/PF)

## (undated) DEVICE — GOWN SURGEONS X-LARGE - DISP. (30/CA)

## (undated) DEVICE — TUBING CLEARLINK DUO-VENT - C-FLO (48EA/CA)

## (undated) DEVICE — MASK ANESTHESIA ADULT  - (100/CA)

## (undated) DEVICE — SUCTION INSTRUMENT YANKAUER BULBOUS TIP W/O VENT (50EA/CA)

## (undated) DEVICE — TRACKER ENT PATIENT

## (undated) DEVICE — TUBING ENDOSCRUB II (5EA/PK)

## (undated) DEVICE — SPONGE GAUZE NON-STERILE 2X2 - 4-PLY (200/PK 40PK/CA)

## (undated) DEVICE — BLADE CURVED RAD 60 DEGREE 4.0MM X 11CM (5EA/PK)

## (undated) DEVICE — CATHETER IV 20 GA X 1-1/4 ---SURG.& SDS ONLY--- (50EA/BX)

## (undated) DEVICE — SUTURE GENERAL

## (undated) DEVICE — PATTIES SURG X-RAYCOTTONOID - 1/2 X 3 IN (200/CA)

## (undated) DEVICE — TRACKER ENT INSTRUMENT

## (undated) DEVICE — SUTURE 4-0 PLAIN GUT SC-1 ETHICON (36PK/BX)

## (undated) DEVICE — GLOVE BIOGEL PI INDICATOR SZ 7.0 SURGICAL PF LF - (50/BX 4BX/CA)

## (undated) DEVICE — PACK ENT OR - (2EA/CA)

## (undated) DEVICE — SET LEADWIRE 5 LEAD BEDSIDE DISPOSABLE ECG (1SET OF 5/EA)

## (undated) DEVICE — SET EXTENSION WITH 2 PORTS (48EA/CA) ***PART #2C8610 IS A SUBSTITUTE*****

## (undated) DEVICE — MEDICINE CUP STERILE 2 OZ - (100/CA)

## (undated) DEVICE — BOVIE FOOT CONTROL SUCTION - 6IN 10FR (25EA/CA)

## (undated) DEVICE — ANTI-FOG SOLUTION - 60BTL/CA

## (undated) DEVICE — TUBE E-T HI-LO CUFF 6.5MM (10EA/BX)

## (undated) DEVICE — LACTATED RINGERS INJ 1000 ML - (14EA/CA 60CA/PF)